# Patient Record
Sex: FEMALE | Race: WHITE | Employment: OTHER | URBAN - METROPOLITAN AREA
[De-identification: names, ages, dates, MRNs, and addresses within clinical notes are randomized per-mention and may not be internally consistent; named-entity substitution may affect disease eponyms.]

---

## 2017-01-23 PROBLEM — J30.0 VMR (VASOMOTOR RHINITIS): Status: ACTIVE | Noted: 2017-01-23

## 2017-02-07 PROBLEM — M18.0 PRIMARY OSTEOARTHRITIS OF BOTH FIRST CARPOMETACARPAL JOINTS: Status: ACTIVE | Noted: 2017-02-07

## 2017-02-07 PROBLEM — M65.312 TRIGGER FINGER OF LEFT THUMB: Status: ACTIVE | Noted: 2017-02-07

## 2017-03-17 ENCOUNTER — OFFICE VISIT (OUTPATIENT)
Dept: UROLOGY | Facility: HOSPITAL | Age: 62
End: 2017-03-17
Attending: OBSTETRICS & GYNECOLOGY
Payer: COMMERCIAL

## 2017-03-17 VITALS
SYSTOLIC BLOOD PRESSURE: 118 MMHG | WEIGHT: 140 LBS | DIASTOLIC BLOOD PRESSURE: 70 MMHG | BODY MASS INDEX: 21.46 KG/M2 | HEIGHT: 67.75 IN

## 2017-03-17 DIAGNOSIS — N81.11 MIDLINE CYSTOCELE: ICD-10-CM

## 2017-03-17 DIAGNOSIS — IMO0002 CYSTOCELE: Primary | ICD-10-CM

## 2017-03-17 DIAGNOSIS — N81.6 RECTOCELE: ICD-10-CM

## 2017-03-17 DIAGNOSIS — N95.2 POSTMENOPAUSAL ATROPHIC VAGINITIS: ICD-10-CM

## 2017-03-17 PROCEDURE — 99201 HC OUTPT EVAL AND MGNT NEW PT LEVEL 1: CPT

## 2017-03-17 RX ORDER — DOXEPIN HYDROCHLORIDE 50 MG/1
1 CAPSULE ORAL DAILY
COMMUNITY
End: 2017-11-24 | Stop reason: ALTCHOICE

## 2017-03-17 RX ORDER — UBIDECARENONE 75 MG
250 CAPSULE ORAL DAILY
COMMUNITY
End: 2017-11-24 | Stop reason: ALTCHOICE

## 2017-03-29 ENCOUNTER — OFFICE VISIT (OUTPATIENT)
Dept: UROLOGY | Facility: HOSPITAL | Age: 62
End: 2017-03-29
Attending: OBSTETRICS & GYNECOLOGY
Payer: COMMERCIAL

## 2017-03-29 VITALS — DIASTOLIC BLOOD PRESSURE: 82 MMHG | SYSTOLIC BLOOD PRESSURE: 124 MMHG

## 2017-03-29 DIAGNOSIS — N81.2 UTEROVAGINAL PROLAPSE, INCOMPLETE: Primary | ICD-10-CM

## 2017-03-29 LAB
CONTROL RUN WITHIN 24 HOURS?: YES
LEUKOCYTE ESTERASE URINE: NEGATIVE
NITRITE URINE: NEGATIVE

## 2017-03-29 PROCEDURE — 51729 CYSTOMETROGRAM W/VP&UP: CPT

## 2017-03-29 PROCEDURE — 51741 ELECTRO-UROFLOWMETRY FIRST: CPT

## 2017-03-29 PROCEDURE — 51784 ANAL/URINARY MUSCLE STUDY: CPT

## 2017-03-29 PROCEDURE — 57160 INSERT PESSARY/OTHER DEVICE: CPT

## 2017-03-29 PROCEDURE — 51797 INTRAABDOMINAL PRESSURE TEST: CPT

## 2017-03-29 NOTE — PROCEDURES
.Patient here for urodynamic testing. Procedure explained and confirmed by patient. See evaluation form for results. Both verbal and written discharge instructions were given.   Patient tolerated procedure well and will follow up with Dr. Austen Long on tablet Rfl: 3   Albuterol Sulfate HFA (PROAIR HFA) 108 (90 BASE) MCG/ACT Inhalation Aero Soln Inhale 2 puffs into the lungs every 6 (six) hours as needed for Wheezing (no more 8 inhalations in 24 hours).  Disp: 1 Inhaler Rfl: 3   CAPSAICIN HP EX Use as dire Leak Point Pressure Leak? Cough Valsalva      100mL 155 69    cm H2O []  Yes [x]  No   REDUCED: pessary  Volume Leak Point Pressure Leak?     Cough Valsalva      150mL 156 60    cm H2O []  Yes [x]  No   250mL 165 55    cm H2O []  Yes [x]  No   350mL 162

## 2017-03-29 NOTE — PATIENT INSTRUCTIONS
[address] MEDICINE    HAVING A URINARY CATHETER AFTER SURGERY    What is a urinary catheter? A catheter is a soft tube used to drain urine from your bladder. Why do I need a catheter?   A urinary catheter is used to help with heali following tips:  · Urinate normally then stand up, walk around for a minute or two, sit back down on the commode and attempt to urinate (double void). · Sit in a tub of warm water and try to urinate in the tub.   · Run warm water over your vaginal area whi are given an appointment to come back to discuss the results and any appropriate treatment recommendations. Please do not hesitate to contact our office with any questions or concerns at [phone].     I acknowledge that I have received verbal and wri

## 2017-04-05 PROBLEM — J45.30 MILD PERSISTENT ASTHMA WITHOUT COMPLICATION: Status: ACTIVE | Noted: 2017-04-05

## 2017-04-05 PROBLEM — J45.30 MILD PERSISTENT ASTHMA WITHOUT COMPLICATION (HCC): Status: ACTIVE | Noted: 2017-04-05

## 2017-04-14 ENCOUNTER — TELEPHONE (OUTPATIENT)
Dept: UROLOGY | Facility: HOSPITAL | Age: 62
End: 2017-04-14

## 2017-04-14 NOTE — TELEPHONE ENCOUNTER
Pt called worried about upcoming surgery. When [facility] called her for preop information, they didn't have listed she was having a hysterectomy.  Discussed w/ pt that Dr Marquita Gomes just hasn't sent her information over yet, but she is still scheduled in our syste

## 2017-04-19 ENCOUNTER — OFFICE VISIT (OUTPATIENT)
Dept: UROLOGY | Facility: HOSPITAL | Age: 62
End: 2017-04-19
Attending: OBSTETRICS & GYNECOLOGY
Payer: COMMERCIAL

## 2017-04-19 VITALS
HEIGHT: 68 IN | BODY MASS INDEX: 21.98 KG/M2 | WEIGHT: 145 LBS | SYSTOLIC BLOOD PRESSURE: 126 MMHG | DIASTOLIC BLOOD PRESSURE: 82 MMHG

## 2017-04-19 DIAGNOSIS — N95.2 POSTMENOPAUSAL ATROPHIC VAGINITIS: ICD-10-CM

## 2017-04-19 DIAGNOSIS — N81.11 MIDLINE CYSTOCELE: ICD-10-CM

## 2017-04-19 DIAGNOSIS — N81.2 UTEROVAGINAL PROLAPSE, INCOMPLETE: Primary | ICD-10-CM

## 2017-04-19 DIAGNOSIS — N81.6 RECTOCELE: ICD-10-CM

## 2017-04-19 PROCEDURE — 99211 OFF/OP EST MAY X REQ PHY/QHP: CPT

## 2017-05-02 PROBLEM — R94.31 ABNORMAL ELECTROCARDIOGRAM (ECG) (EKG): Status: ACTIVE | Noted: 2017-05-02

## 2017-05-22 ENCOUNTER — TELEPHONE (OUTPATIENT)
Dept: OBGYN CLINIC | Facility: CLINIC | Age: 62
End: 2017-05-22

## 2017-05-22 NOTE — TELEPHONE ENCOUNTER
Pt has a surgery on [id number], but has jus returned from overse[place] trip and has a lot of swelling in feet and ankles.  She was given diuretic, and was suggested to take magnesium, but worried that any of those treatments, or just the swelling may interfere wi

## 2017-05-23 NOTE — TELEPHONE ENCOUNTER
Patient informed that per Dr. Lonine Mcgill directions she can proceed with surgery as schedule. Patient verbalizes understanding.

## 2017-05-24 NOTE — H&P
[facility]    PATIENT'S NAME: Brenna Rendon   ATTENDING PHYSICIAN: Austin Braun M.D.    PATIENT ACCOUNT#:   [de-identified]    LOCATION:  OR   [facility]  MEDICAL RECORD #:   [id number]       YOB: 1955  ADMISSION DATE:       05/25/2017    HISTORY

## 2017-05-25 ENCOUNTER — SURGERY (OUTPATIENT)
Age: 62
End: 2017-05-25

## 2017-05-25 ENCOUNTER — HOSPITAL ENCOUNTER (OUTPATIENT)
Facility: HOSPITAL | Age: 62
Setting detail: OBSERVATION
Discharge: HOME | End: 2017-05-27
Attending: OBSTETRICS & GYNECOLOGY | Admitting: OBSTETRICS & GYNECOLOGY
Payer: COMMERCIAL

## 2017-05-25 PROCEDURE — 0UT97ZZ RESECTION OF UTERUS, VIA NATURAL OR ARTIFICIAL OPENING: ICD-10-PCS | Performed by: OBSTETRICS & GYNECOLOGY

## 2017-05-25 PROCEDURE — 0UT77ZZ RESECTION OF BILATERAL FALLOPIAN TUBES, VIA NATURAL OR ARTIFICIAL OPENING: ICD-10-PCS | Performed by: OBSTETRICS & GYNECOLOGY

## 2017-05-25 PROCEDURE — 0JQC0ZZ REPAIR PELVIC REGION SUBCUTANEOUS TISSUE AND FASCIA, OPEN APPROACH: ICD-10-PCS | Performed by: OBSTETRICS & GYNECOLOGY

## 2017-05-25 PROCEDURE — 0UTC7ZZ RESECTION OF CERVIX, VIA NATURAL OR ARTIFICIAL OPENING: ICD-10-PCS | Performed by: OBSTETRICS & GYNECOLOGY

## 2017-05-25 PROCEDURE — 0WQNXZZ REPAIR FEMALE PERINEUM, EXTERNAL APPROACH: ICD-10-PCS | Performed by: OBSTETRICS & GYNECOLOGY

## 2017-05-25 PROCEDURE — 58262 VAG HYST INCLUDING T/O: CPT | Performed by: OBSTETRICS & GYNECOLOGY

## 2017-05-25 PROCEDURE — 0UQF0ZZ REPAIR CUL-DE-SAC, OPEN APPROACH: ICD-10-PCS | Performed by: OBSTETRICS & GYNECOLOGY

## 2017-05-25 PROCEDURE — 0USGXZZ REPOSITION VAGINA, EXTERNAL APPROACH: ICD-10-PCS | Performed by: OBSTETRICS & GYNECOLOGY

## 2017-05-25 RX ORDER — DOCUSATE SODIUM 100 MG/1
100 CAPSULE, LIQUID FILLED ORAL 2 TIMES DAILY
Status: DISCONTINUED | OUTPATIENT
Start: 2017-05-25 | End: 2017-05-27

## 2017-05-25 RX ORDER — GABAPENTIN 300 MG/1
CAPSULE ORAL AS NEEDED
COMMUNITY

## 2017-05-25 RX ORDER — LIDOCAINE HYDROCHLORIDE AND EPINEPHRINE 10; 10 MG/ML; UG/ML
INJECTION, SOLUTION INFILTRATION; PERINEURAL AS NEEDED
Status: DISCONTINUED | OUTPATIENT
Start: 2017-05-25 | End: 2017-05-25 | Stop reason: HOSPADM

## 2017-05-25 RX ORDER — DIPHENHYDRAMINE HYDROCHLORIDE 50 MG/ML
12.5 INJECTION INTRAMUSCULAR; INTRAVENOUS EVERY 4 HOURS PRN
Status: DISCONTINUED | OUTPATIENT
Start: 2017-05-25 | End: 2017-05-27

## 2017-05-25 RX ORDER — ONDANSETRON 2 MG/ML
INJECTION INTRAMUSCULAR; INTRAVENOUS
Status: COMPLETED
Start: 2017-05-25 | End: 2017-05-25

## 2017-05-25 RX ORDER — ONDANSETRON 2 MG/ML
4 INJECTION INTRAMUSCULAR; INTRAVENOUS AS NEEDED
Status: DISCONTINUED | OUTPATIENT
Start: 2017-05-25 | End: 2017-05-25 | Stop reason: HOSPADM

## 2017-05-25 RX ORDER — ACETAMINOPHEN 500 MG
1000 TABLET ORAL 2 TIMES DAILY
Status: DISCONTINUED | OUTPATIENT
Start: 2017-05-25 | End: 2017-05-27

## 2017-05-25 RX ORDER — ONDANSETRON 2 MG/ML
4 INJECTION INTRAMUSCULAR; INTRAVENOUS EVERY 8 HOURS PRN
Status: DISCONTINUED | OUTPATIENT
Start: 2017-05-25 | End: 2017-05-27

## 2017-05-25 RX ORDER — KETOROLAC TROMETHAMINE 30 MG/ML
15 INJECTION, SOLUTION INTRAMUSCULAR; INTRAVENOUS EVERY 6 HOURS PRN
Status: DISCONTINUED | OUTPATIENT
Start: 2017-05-25 | End: 2017-05-26

## 2017-05-25 RX ORDER — HYDROCODONE BITARTRATE AND ACETAMINOPHEN 5; 325 MG/1; MG/1
2 TABLET ORAL EVERY 4 HOURS PRN
Status: DISCONTINUED | OUTPATIENT
Start: 2017-05-25 | End: 2017-05-27

## 2017-05-25 RX ORDER — HYDROMORPHONE HYDROCHLORIDE 1 MG/ML
INJECTION, SOLUTION INTRAMUSCULAR; INTRAVENOUS; SUBCUTANEOUS
Status: COMPLETED
Start: 2017-05-25 | End: 2017-05-25

## 2017-05-25 RX ORDER — ONDANSETRON 2 MG/ML
4 INJECTION INTRAMUSCULAR; INTRAVENOUS EVERY 6 HOURS PRN
Status: DISCONTINUED | OUTPATIENT
Start: 2017-05-25 | End: 2017-05-27

## 2017-05-25 RX ORDER — HYDROCODONE BITARTRATE AND ACETAMINOPHEN 5; 325 MG/1; MG/1
1 TABLET ORAL EVERY 4 HOURS PRN
Status: DISCONTINUED | OUTPATIENT
Start: 2017-05-25 | End: 2017-05-25

## 2017-05-25 RX ORDER — HYDROCODONE BITARTRATE AND ACETAMINOPHEN 5; 325 MG/1; MG/1
2 TABLET ORAL EVERY 4 HOURS PRN
Status: DISCONTINUED | OUTPATIENT
Start: 2017-05-25 | End: 2017-05-25

## 2017-05-25 RX ORDER — DEXAMETHASONE SODIUM PHOSPHATE 4 MG/ML
4 VIAL (ML) INJECTION AS NEEDED
Status: DISCONTINUED | OUTPATIENT
Start: 2017-05-25 | End: 2017-05-25 | Stop reason: HOSPADM

## 2017-05-25 RX ORDER — IBUPROFEN 600 MG/1
600 TABLET ORAL EVERY 6 HOURS PRN
Status: DISCONTINUED | OUTPATIENT
Start: 2017-05-25 | End: 2017-05-27

## 2017-05-25 RX ORDER — MIDAZOLAM HYDROCHLORIDE 1 MG/ML
1 INJECTION INTRAMUSCULAR; INTRAVENOUS EVERY 5 MIN PRN
Status: DISCONTINUED | OUTPATIENT
Start: 2017-05-25 | End: 2017-05-25 | Stop reason: HOSPADM

## 2017-05-25 RX ORDER — SODIUM CHLORIDE, SODIUM LACTATE, POTASSIUM CHLORIDE, CALCIUM CHLORIDE 600; 310; 30; 20 MG/100ML; MG/100ML; MG/100ML; MG/100ML
INJECTION, SOLUTION INTRAVENOUS CONTINUOUS
Status: DISCONTINUED | OUTPATIENT
Start: 2017-05-25 | End: 2017-05-26

## 2017-05-25 RX ORDER — HYDROMORPHONE HYDROCHLORIDE 1 MG/ML
0.4 INJECTION, SOLUTION INTRAMUSCULAR; INTRAVENOUS; SUBCUTANEOUS EVERY 5 MIN PRN
Status: DISCONTINUED | OUTPATIENT
Start: 2017-05-25 | End: 2017-05-25 | Stop reason: HOSPADM

## 2017-05-25 RX ORDER — GABAPENTIN 100 MG/1
200 CAPSULE ORAL 2 TIMES DAILY
Status: DISCONTINUED | OUTPATIENT
Start: 2017-05-25 | End: 2017-05-27

## 2017-05-25 RX ORDER — AZELASTINE 1 MG/ML
1 SPRAY, METERED NASAL EVERY MORNING
COMMUNITY
End: 2018-07-18

## 2017-05-25 RX ORDER — METOCLOPRAMIDE HYDROCHLORIDE 5 MG/ML
10 INJECTION INTRAMUSCULAR; INTRAVENOUS AS NEEDED
Status: DISCONTINUED | OUTPATIENT
Start: 2017-05-25 | End: 2017-05-25 | Stop reason: HOSPADM

## 2017-05-25 RX ORDER — HYDROCODONE BITARTRATE AND ACETAMINOPHEN 5; 325 MG/1; MG/1
1 TABLET ORAL EVERY 4 HOURS PRN
Status: DISCONTINUED | OUTPATIENT
Start: 2017-05-25 | End: 2017-05-27

## 2017-05-25 RX ORDER — NALOXONE HYDROCHLORIDE 0.4 MG/ML
0.08 INJECTION, SOLUTION INTRAMUSCULAR; INTRAVENOUS; SUBCUTANEOUS
Status: DISCONTINUED | OUTPATIENT
Start: 2017-05-25 | End: 2017-05-27

## 2017-05-25 RX ORDER — KETOROLAC TROMETHAMINE 30 MG/ML
30 INJECTION, SOLUTION INTRAMUSCULAR; INTRAVENOUS EVERY 6 HOURS PRN
Status: DISCONTINUED | OUTPATIENT
Start: 2017-05-25 | End: 2017-05-25

## 2017-05-25 RX ORDER — MEPERIDINE HYDROCHLORIDE 25 MG/ML
12.5 INJECTION INTRAMUSCULAR; INTRAVENOUS; SUBCUTANEOUS AS NEEDED
Status: DISCONTINUED | OUTPATIENT
Start: 2017-05-25 | End: 2017-05-25 | Stop reason: HOSPADM

## 2017-05-25 RX ORDER — ZOLPIDEM TARTRATE 5 MG/1
5 TABLET ORAL NIGHTLY PRN
Status: DISCONTINUED | OUTPATIENT
Start: 2017-05-25 | End: 2017-05-27

## 2017-05-25 RX ORDER — DEXTROSE, SODIUM CHLORIDE, SODIUM LACTATE, POTASSIUM CHLORIDE, AND CALCIUM CHLORIDE 5; .6; .31; .03; .02 G/100ML; G/100ML; G/100ML; G/100ML; G/100ML
INJECTION, SOLUTION INTRAVENOUS CONTINUOUS
Status: DISCONTINUED | OUTPATIENT
Start: 2017-05-25 | End: 2017-05-26

## 2017-05-25 RX ORDER — KETOROLAC TROMETHAMINE 30 MG/ML
INJECTION, SOLUTION INTRAMUSCULAR; INTRAVENOUS
Status: COMPLETED
Start: 2017-05-25 | End: 2017-05-25

## 2017-05-25 RX ORDER — DIPHENHYDRAMINE HYDROCHLORIDE 50 MG/ML
12.5 INJECTION INTRAMUSCULAR; INTRAVENOUS AS NEEDED
Status: DISCONTINUED | OUTPATIENT
Start: 2017-05-25 | End: 2017-05-25 | Stop reason: HOSPADM

## 2017-05-25 RX ORDER — KETOROLAC TROMETHAMINE 30 MG/ML
30 INJECTION, SOLUTION INTRAMUSCULAR; INTRAVENOUS EVERY 6 HOURS PRN
Status: DISCONTINUED | OUTPATIENT
Start: 2017-05-25 | End: 2017-05-26

## 2017-05-25 RX ORDER — ONDANSETRON 4 MG/1
4 TABLET, FILM COATED ORAL EVERY 8 HOURS PRN
Status: DISCONTINUED | OUTPATIENT
Start: 2017-05-25 | End: 2017-05-27

## 2017-05-25 RX ORDER — METOCLOPRAMIDE HYDROCHLORIDE 5 MG/ML
10 INJECTION INTRAMUSCULAR; INTRAVENOUS EVERY 8 HOURS PRN
Status: DISCONTINUED | OUTPATIENT
Start: 2017-05-25 | End: 2017-05-27

## 2017-05-25 RX ORDER — HYDROMORPHONE HYDROCHLORIDE 1 MG/ML
0.4 INJECTION, SOLUTION INTRAMUSCULAR; INTRAVENOUS; SUBCUTANEOUS EVERY 30 MIN PRN
Status: DISCONTINUED | OUTPATIENT
Start: 2017-05-25 | End: 2017-05-26

## 2017-05-25 RX ORDER — NALOXONE HYDROCHLORIDE 0.4 MG/ML
80 INJECTION, SOLUTION INTRAMUSCULAR; INTRAVENOUS; SUBCUTANEOUS AS NEEDED
Status: DISCONTINUED | OUTPATIENT
Start: 2017-05-25 | End: 2017-05-25 | Stop reason: HOSPADM

## 2017-05-25 RX ORDER — NALBUPHINE HCL 10 MG/ML
2.5 AMPUL (ML) INJECTION EVERY 4 HOURS PRN
Status: DISCONTINUED | OUTPATIENT
Start: 2017-05-25 | End: 2017-05-27

## 2017-05-25 NOTE — OPERATIVE REPORT
PRE-OP DIAGNOSIS:  Uterovaginal prolapse    POST-OP DIAGNOSIS:  Same    PROCEDURE:  Repair of enterocele; uterosacral ligament suspension; anterior colporrhaphy; posterior colporrhaphy; cystourethroscopy    SURGEON:  Sonal Causey MD    ASSISTANT:  Zahra Stewart in addition, urine was again seen from both ureteral orifices, confirming that the ureters remained patent. Attention was then directed to the posterior compartment. The redundant posterior vaginal epithelium was excised.  0 Vicryl suture was then use

## 2017-05-25 NOTE — PROGRESS NOTES
[facility]  Brief Op Note    Jason Poster Location: OR   CSN [id number] MRN [id number]   Admission Date 5/25/2017 Operation Date 5/25/2017   Attending Physician Allison Murillo MD Operating Physician Farhad Valdovinos MD     Pre-Operative Diagnosis: Marlena Miguel

## 2017-05-25 NOTE — CONSULTS
[facility] Pharmacy Note:  Pain Consult    Chey Bowen is a 62year old female started on Dilaudid PCA by Dr. Arlene Chahal. Pharmacy was consulted to review medication profile and to discontinue previously ordered narcotics and sedatives.     Medication profile was

## 2017-05-25 NOTE — INTERVAL H&P NOTE
Pre-op Diagnosis: UTEROVAGINAL PROLAPSE, CYSTOCELE, RECTOCELE      The above referenced H&P was reviewed by Stephanie Stroud MD on 5/25/2017, the patient was examined and no significant changes have occurred in the patient's condition since the H&P was perf

## 2017-05-25 NOTE — PLAN OF CARE
GASTROINTESTINAL - ADULT    • Maintains or returns to baseline bowel function Not Progressing        Patient/Family Goals    • Patient/Family Long Term Goal Not Progressing          GASTROINTESTINAL - ADULT    • Minimal or absence of nausea and vomiting Pr

## 2017-05-26 PROBLEM — N81.4 UTEROVAGINAL PROLAPSE: Status: ACTIVE | Noted: 2017-05-26

## 2017-05-26 RX ORDER — IBUPROFEN 600 MG/1
600 TABLET ORAL EVERY 6 HOURS
Qty: 40 TABLET | Refills: 2 | Status: SHIPPED
Start: 2017-05-26 | End: 2017-06-21

## 2017-05-26 RX ORDER — HYDROCODONE BITARTRATE AND ACETAMINOPHEN 5; 325 MG/1; MG/1
1-2 TABLET ORAL EVERY 6 HOURS PRN
Qty: 30 TABLET | Refills: 0 | Status: SHIPPED
Start: 2017-05-26 | End: 2017-05-27

## 2017-05-26 RX ORDER — HYDROCODONE BITARTRATE AND ACETAMINOPHEN 5; 325 MG/1; MG/1
1 TABLET ORAL EVERY 4 HOURS PRN
Qty: 30 TABLET | Refills: 0 | Status: SHIPPED | OUTPATIENT
Start: 2017-05-26 | End: 2017-06-21

## 2017-05-26 RX ORDER — ONDANSETRON 4 MG/1
4 TABLET, ORALLY DISINTEGRATING ORAL ONCE
Status: COMPLETED | OUTPATIENT
Start: 2017-05-26 | End: 2017-05-26

## 2017-05-26 NOTE — PLAN OF CARE
Assumed pt care at 0730. Aa/ox4. Breathing unlabored. C/o surgical site pain. Tolerated norco well this morning and pain now controlled. Hesitant to get out of bed earlier, has been in the chair already and ambulated in hallway.  Yoni villeda, patent, will

## 2017-05-26 NOTE — PAYOR COMM NOTE
Attending Physician: Daquan Storm MD    Review Type: ADMISSION   Reviewer: Saqib Jones       Date: May 26, 2017 - 8:29 AM  Payor: [employer]  Authorization Number: [id number]/A  Admit date: 5/25/2017  9:44 AM   Admitted from Emergency Dept.:     PRE-OP DIAGNOSI 5/25/2017 1101 Restarted 2 g Intravenous Rani Mejia MD      dextrose 5 % /lactated ringers infusion     Date Action Dose Route User    5/25/2017 1343 New Bag (none) Intravenous Albertina Ortega, RN      docusate sodium (COLACE) cap 100 mg     Date Actio Abnormality         Status                     ---------                               -----------         ------                     CBC W/ DIFFERENTIAL[[id number]]                                                           Please view results for

## 2017-05-26 NOTE — PROGRESS NOTES
Attempted to ambulate earlier, but had coughing attack; now waiting to use inhalers    Operative findings discussed     Pain controlled, tolerating general diet  Abd soft, NT  urine clear in [place]    POD 1 - VH, repairs - doing well    PLAN -  Ambulate; PO

## 2017-05-27 ENCOUNTER — APPOINTMENT (OUTPATIENT)
Dept: GENERAL RADIOLOGY | Facility: HOSPITAL | Age: 62
End: 2017-05-27
Attending: OBSTETRICS & GYNECOLOGY
Payer: COMMERCIAL

## 2017-05-27 VITALS
HEART RATE: 72 BPM | OXYGEN SATURATION: 94 % | BODY MASS INDEX: 22.13 KG/M2 | TEMPERATURE: 98 F | HEIGHT: 68 IN | WEIGHT: 146 LBS | DIASTOLIC BLOOD PRESSURE: 74 MMHG | RESPIRATION RATE: 16 BRPM | SYSTOLIC BLOOD PRESSURE: 142 MMHG

## 2017-05-27 PROCEDURE — 74000 XR ABDOMEN (KUB) (1 AP VIEW)  (CPT=74000): CPT | Performed by: OBSTETRICS & GYNECOLOGY

## 2017-05-27 RX ORDER — ONDANSETRON 4 MG/1
4 TABLET, ORALLY DISINTEGRATING ORAL EVERY 8 HOURS PRN
Qty: 15 TABLET | Refills: 0 | Status: SHIPPED | OUTPATIENT
Start: 2017-05-27 | End: 2017-06-21

## 2017-05-27 RX ORDER — HYDROCODONE BITARTRATE AND ACETAMINOPHEN 5; 325 MG/1; MG/1
1-2 TABLET ORAL EVERY 6 HOURS PRN
Qty: 30 TABLET | Refills: 0 | Status: SHIPPED | OUTPATIENT
Start: 2017-05-27 | End: 2017-06-21

## 2017-05-27 RX ORDER — ACETAMINOPHEN 500 MG
1000 TABLET ORAL 2 TIMES DAILY
Qty: 30 TABLET | Refills: 0 | Status: SHIPPED | OUTPATIENT
Start: 2017-05-27 | End: 2017-05-27

## 2017-05-27 RX ORDER — ACETAMINOPHEN 500 MG
1000 TABLET ORAL EVERY 6 HOURS PRN
Qty: 30 TABLET | Refills: 0 | Status: SHIPPED | OUTPATIENT
Start: 2017-05-27 | End: 2017-06-21

## 2017-05-27 NOTE — PROGRESS NOTES
Spoke with dr Cristiano Bryan re: pt dry heaving & pt c/o nausea. New orders noted for stat kub & stat labs.   Lab & xray paged to notify of new orders

## 2017-05-27 NOTE — PLAN OF CARE
Assumed care for this patient at 0730: patient alert and oriented. S/p vaginal hysterectomy. Complaining of abdominal pain. norco prn. Patient states she is comfortable. Denies nausea. Walked in [place]. Patient encourage to walk and increase activity.  Plan f

## 2017-05-27 NOTE — PROGRESS NOTES
Patient did not go home yesterday due to nausea and lightheadedness from [address] this am until took norco and symptoms reoccured,. To avoid norco and take ibuprofen for pain.  Plan on discharge this pm

## 2017-05-27 NOTE — PLAN OF CARE
GASTROINTESTINAL - ADULT    • Minimal or absence of nausea and vomiting Adequate for Discharge    • Maintains or returns to baseline bowel function Adequate for Discharge        GENITOURINARY - ADULT    • Absence of urinary retention Adequate for Discharge

## 2017-05-27 NOTE — PLAN OF CARE
Patient still with dizziness on and off. Spoke with MD. Encourage to take motrin and tylenol and avoid norco. Patient able to tolerate a little bit of soup this afternoon.

## 2017-05-27 NOTE — DISCHARGE SUMMARY
[facility]    PATIENT'S NAME: Tretha Duverney   ATTENDING PHYSICIAN: Maximo Vealrde M.D.    PATIENT ACCOUNT#:   [de-identified]    LOCATION:  [address] [place] [address]  MEDICAL RECORD #:   [id number]       YOB: 1955  ADMISSION DATE:       05/25/2017

## 2017-05-27 NOTE — PROGRESS NOTES
Spoke with dr Stanislav Feliz md aware of stat test results.    md states to d/c home with rx for zofran odt

## 2017-05-28 NOTE — PROGRESS NOTES
Pt d/c home. D/c instructed given to pt & pt's  at great length, including shrestha cath care, pelvic rest, diet, hydration, activity, pain control, home meds & f/u care. Verbalized understanding of all instructions. Left unit stable via w/c.

## 2017-05-31 ENCOUNTER — OFFICE VISIT (OUTPATIENT)
Dept: UROLOGY | Facility: HOSPITAL | Age: 62
End: 2017-05-31
Attending: OBSTETRICS & GYNECOLOGY
Payer: COMMERCIAL

## 2017-05-31 VITALS
WEIGHT: 146 LBS | HEART RATE: 80 BPM | DIASTOLIC BLOOD PRESSURE: 78 MMHG | HEIGHT: 67 IN | SYSTOLIC BLOOD PRESSURE: 110 MMHG | BODY MASS INDEX: 22.91 KG/M2 | TEMPERATURE: 98 F

## 2017-05-31 DIAGNOSIS — R33.9 URINARY RETENTION: ICD-10-CM

## 2017-05-31 DIAGNOSIS — Z98.890 POST-OPERATIVE STATE: Primary | ICD-10-CM

## 2017-05-31 PROCEDURE — 99212 OFFICE O/P EST SF 10 MIN: CPT

## 2017-05-31 NOTE — PATIENT INSTRUCTIONS
Voiding Trial Instructions  You have passed your voiding trial at 9:30. Please make sure you are drinking some water today. You can take your Motrin to help with any swelling from the catheter.   It is important to try and empty your bladder every two dinora

## 2017-05-31 NOTE — PROCEDURES
.Patient here for voiding trial.  Has not been taking ibuprofen rt nausea, no emesis. Feels she does not react well to anesthesia. Eating bananas and toast mostly. Walking about home but feeling weak and wanting catheter out.   Says it is causing her dis

## 2017-06-05 ENCOUNTER — TELEPHONE (OUTPATIENT)
Dept: UROLOGY | Facility: HOSPITAL | Age: 62
End: 2017-06-05

## 2017-06-05 NOTE — TELEPHONE ENCOUNTER
Pt called with co left sided lower quad pain that gets much worse when she is coughing, feels pain is getting worse, hurts to lift her leg, denies any swelling, SOB, fever, constipation, voiding without difficulty, taking Motrin,  called her internist to g

## 2017-06-20 ENCOUNTER — TELEPHONE (OUTPATIENT)
Dept: OBGYN CLINIC | Facility: CLINIC | Age: 62
End: 2017-06-20

## 2017-06-20 NOTE — TELEPHONE ENCOUNTER
Pt is post TVH on 05/22/17, has a f/u appt on 06/22/17.  Expressing concern about itching in vag area

## 2017-06-21 PROBLEM — Z12.31 SCREENING MAMMOGRAM, ENCOUNTER FOR: Status: ACTIVE | Noted: 2017-06-21

## 2017-06-21 PROBLEM — Z11.59 NEED FOR HEPATITIS C SCREENING TEST: Status: ACTIVE | Noted: 2017-06-21

## 2017-06-21 PROBLEM — Z79.899 ENCOUNTER FOR LONG-TERM (CURRENT) USE OF MEDICATIONS: Status: ACTIVE | Noted: 2017-06-21

## 2017-06-21 PROBLEM — Z00.00 LABORATORY EXAMINATION ORDERED AS PART OF A ROUTINE GENERAL MEDICAL EXAMINATION: Status: ACTIVE | Noted: 2017-06-21

## 2017-06-21 PROBLEM — N81.4 UTEROVAGINAL PROLAPSE: Status: RESOLVED | Noted: 2017-05-26 | Resolved: 2017-06-21

## 2017-06-21 PROBLEM — Z98.890 STATUS POST COLONOSCOPY: Status: ACTIVE | Noted: 2017-06-21

## 2017-06-21 NOTE — TELEPHONE ENCOUNTER
Patient states that her symptoms are resolved as of today, she does have an appointment tomorrow with Dr. Dede Bhat to follow up.

## 2017-06-22 ENCOUNTER — OFFICE VISIT (OUTPATIENT)
Dept: OBGYN CLINIC | Facility: CLINIC | Age: 62
End: 2017-06-22

## 2017-06-22 VITALS
SYSTOLIC BLOOD PRESSURE: 120 MMHG | DIASTOLIC BLOOD PRESSURE: 70 MMHG | WEIGHT: 145 LBS | RESPIRATION RATE: 16 BRPM | HEIGHT: 67 IN | HEART RATE: 72 BPM | TEMPERATURE: 99 F | BODY MASS INDEX: 22.76 KG/M2

## 2017-06-22 DIAGNOSIS — Z90.710 H/O: HYSTERECTOMY: Primary | ICD-10-CM

## 2017-06-22 NOTE — PROGRESS NOTES
She had a vaginal hysterectomy and repairs with Dr. Bart Torrez. She went home with the catheter. It is removed in Dr. Kurtis Moya office and she has not had any problems urinating. No problems with the bowel movement.   Had vaginal itching 2 days ago use 1 day min

## 2017-06-29 PROBLEM — M75.01 ADHESIVE CAPSULITIS OF RIGHT SHOULDER: Status: ACTIVE | Noted: 2017-06-29

## 2017-06-29 PROBLEM — M25.812 SHOULDER IMPINGEMENT, LEFT: Status: ACTIVE | Noted: 2017-06-29

## 2017-06-29 PROBLEM — M75.42 SHOULDER IMPINGEMENT, LEFT: Status: ACTIVE | Noted: 2017-06-29

## 2017-07-10 ENCOUNTER — OFFICE VISIT (OUTPATIENT)
Dept: UROLOGY | Facility: HOSPITAL | Age: 62
End: 2017-07-10
Attending: OBSTETRICS & GYNECOLOGY
Payer: COMMERCIAL

## 2017-07-10 VITALS
WEIGHT: 145 LBS | BODY MASS INDEX: 22.76 KG/M2 | HEIGHT: 67 IN | SYSTOLIC BLOOD PRESSURE: 128 MMHG | DIASTOLIC BLOOD PRESSURE: 84 MMHG

## 2017-07-10 DIAGNOSIS — Z98.890 POST-OPERATIVE STATE: Primary | ICD-10-CM

## 2017-07-10 PROCEDURE — 99211 OFF/OP EST MAY X REQ PHY/QHP: CPT

## 2017-08-18 ENCOUNTER — TELEPHONE (OUTPATIENT)
Dept: UROLOGY | Facility: HOSPITAL | Age: 62
End: 2017-08-18

## 2017-08-18 NOTE — TELEPHONE ENCOUNTER
Pt called to ask some questions about her recovery, states she continues to have stomach aches to\"left side of belly button\" about 3 times weekly, having daily, soft bm's, stopped bowel regimen a few weeks ago to see if it would help.   Stomach aches occu

## 2017-08-23 ENCOUNTER — TELEPHONE (OUTPATIENT)
Dept: UROLOGY | Facility: HOSPITAL | Age: 62
End: 2017-08-23

## 2017-08-23 NOTE — TELEPHONE ENCOUNTER
Spoke with Dr. Axel Gomez re pt condition update, Dr. Axel Gomez requests office visit. Pt scheduled 8-30-17 for post op visit to discuss pt concerns. Called and notified, pt agrees to plan.   Pt reports she saw a new internist-GP, was placed on new med, Dicyclomine

## 2017-08-30 ENCOUNTER — OFFICE VISIT (OUTPATIENT)
Dept: UROLOGY | Facility: HOSPITAL | Age: 62
End: 2017-08-30
Attending: OBSTETRICS & GYNECOLOGY
Payer: COMMERCIAL

## 2017-08-30 VITALS
SYSTOLIC BLOOD PRESSURE: 102 MMHG | HEIGHT: 67 IN | WEIGHT: 143 LBS | BODY MASS INDEX: 22.44 KG/M2 | DIASTOLIC BLOOD PRESSURE: 70 MMHG

## 2017-08-30 DIAGNOSIS — Z98.890 POST-OPERATIVE STATE: Primary | ICD-10-CM

## 2017-08-30 PROCEDURE — 99211 OFF/OP EST MAY X REQ PHY/QHP: CPT

## 2017-08-30 RX ORDER — ESTRADIOL 0.1 MG/G
CREAM VAGINAL
Qty: 43 G | Refills: 2 | Status: SHIPPED | OUTPATIENT
Start: 2017-08-30 | End: 2018-11-02

## 2017-10-31 PROBLEM — Z98.890 STATUS POST COLONOSCOPY: Status: RESOLVED | Noted: 2017-06-21 | Resolved: 2017-10-31

## 2017-10-31 PROBLEM — Z12.31 SCREENING MAMMOGRAM, ENCOUNTER FOR: Status: RESOLVED | Noted: 2017-06-21 | Resolved: 2017-10-31

## 2017-10-31 PROBLEM — Z00.00 LABORATORY EXAMINATION ORDERED AS PART OF A ROUTINE GENERAL MEDICAL EXAMINATION: Status: RESOLVED | Noted: 2017-06-21 | Resolved: 2017-10-31

## 2017-10-31 PROBLEM — Z11.59 NEED FOR HEPATITIS C SCREENING TEST: Status: RESOLVED | Noted: 2017-06-21 | Resolved: 2017-10-31

## 2017-10-31 PROBLEM — Z79.899 ENCOUNTER FOR LONG-TERM (CURRENT) USE OF MEDICATIONS: Status: RESOLVED | Noted: 2017-06-21 | Resolved: 2017-10-31

## 2017-11-06 ENCOUNTER — TELEPHONE (OUTPATIENT)
Dept: UROLOGY | Facility: HOSPITAL | Age: 62
End: 2017-11-06

## 2017-11-06 NOTE — TELEPHONE ENCOUNTER
Pt called, continues to c/o stomach aches, not sure if it is related to her surgery in May, is using EStrace cream and intercourse is better, denies difficulty voiding, occasional constipation, reports having stomachpain every time she eats, has been getti

## 2017-11-16 ENCOUNTER — HOSPITAL ENCOUNTER (OUTPATIENT)
Dept: MAMMOGRAPHY | Age: 62
Discharge: HOME | End: 2017-11-16
Attending: OBSTETRICS & GYNECOLOGY
Payer: COMMERCIAL

## 2017-11-16 DIAGNOSIS — Z12.31 VISIT FOR SCREENING MAMMOGRAM: ICD-10-CM

## 2017-11-16 PROCEDURE — 77067 SCR MAMMO BI INCL CAD: CPT | Performed by: OBSTETRICS & GYNECOLOGY

## 2017-11-24 ENCOUNTER — HOSPITAL ENCOUNTER (INPATIENT)
Facility: HOSPITAL | Age: 62
LOS: 3 days | Discharge: HOME | DRG: 392 | End: 2017-11-27
Attending: INTERNAL MEDICINE | Admitting: INTERNAL MEDICINE
Payer: COMMERCIAL

## 2017-11-24 PROBLEM — K57.92 DIVERTICULITIS: Status: ACTIVE | Noted: 2017-11-24

## 2017-11-24 RX ORDER — ACETAMINOPHEN 325 MG/1
650 TABLET ORAL EVERY 6 HOURS PRN
Status: DISCONTINUED | OUTPATIENT
Start: 2017-11-24 | End: 2017-11-27

## 2017-11-24 RX ORDER — ONDANSETRON 2 MG/ML
4 INJECTION INTRAMUSCULAR; INTRAVENOUS EVERY 6 HOURS PRN
Status: DISCONTINUED | OUTPATIENT
Start: 2017-11-24 | End: 2017-11-27

## 2017-11-24 RX ORDER — GABAPENTIN 300 MG/1
300 CAPSULE ORAL 2 TIMES DAILY
Status: DISCONTINUED | OUTPATIENT
Start: 2017-11-24 | End: 2017-11-27

## 2017-11-24 RX ORDER — AZELASTINE 1 MG/ML
1 SPRAY, METERED NASAL EVERY MORNING
Status: DISCONTINUED | OUTPATIENT
Start: 2017-11-25 | End: 2017-11-27

## 2017-11-24 RX ORDER — METRONIDAZOLE 500 MG/100ML
500 INJECTION, SOLUTION INTRAVENOUS EVERY 8 HOURS
Status: DISCONTINUED | OUTPATIENT
Start: 2017-11-24 | End: 2017-11-27

## 2017-11-24 RX ORDER — SODIUM CHLORIDE 9 MG/ML
INJECTION, SOLUTION INTRAVENOUS CONTINUOUS
Status: DISCONTINUED | OUTPATIENT
Start: 2017-11-24 | End: 2017-11-26

## 2017-11-24 RX ORDER — POTASSIUM CHLORIDE 20 MEQ/1
40 TABLET, EXTENDED RELEASE ORAL EVERY 4 HOURS
Status: COMPLETED | OUTPATIENT
Start: 2017-11-24 | End: 2017-11-25

## 2017-11-24 RX ORDER — CIPROFLOXACIN 2 MG/ML
400 INJECTION, SOLUTION INTRAVENOUS EVERY 12 HOURS
Status: DISCONTINUED | OUTPATIENT
Start: 2017-11-24 | End: 2017-11-27

## 2017-11-24 RX ORDER — HEPARIN SODIUM 5000 [USP'U]/ML
5000 INJECTION, SOLUTION INTRAVENOUS; SUBCUTANEOUS EVERY 8 HOURS SCHEDULED
Status: DISCONTINUED | OUTPATIENT
Start: 2017-11-24 | End: 2017-11-27

## 2017-11-24 RX ORDER — ECHINACEA PURPUREA EXTRACT 125 MG
2 TABLET ORAL AS NEEDED
Status: DISCONTINUED | OUTPATIENT
Start: 2017-11-24 | End: 2017-11-27

## 2017-11-24 RX ORDER — FLUTICASONE PROPIONATE 50 MCG
2 SPRAY, SUSPENSION (ML) NASAL DAILY
Status: DISCONTINUED | OUTPATIENT
Start: 2017-11-24 | End: 2017-11-27

## 2017-11-24 NOTE — H&P
[facility] Hospitalist H&P       CC: No chief complaint on file.        PCP: Amalia Gallo MD    History of Present Illness:  Ms. Amrita Gaspar is a 66 yo female with PMH of HTN, GERD, neuropathic cough, uterovaginal prolapse s/p repair of enterocele and cystoure removal from arm  No date: OTHER SURGICAL HISTORY      Comment: cystocele repair 10 yrs ago  5/25/2017: VAGINAL HYSTERECTOMY N/A      Comment: Procedure: VAGINAL HYSTERECTOMY;  Surgeon:                Vince Smith MD;  Location: [address] [place] [address] MAIN OR     ALL:    Ty Data:    CBC/Chem  Recent Labs   Lab  11/24/17   1304   WBC  7.69   HGB  14.0   MCV  94.1   PLT  332   INR  1.2       Recent Labs   Lab  11/24/17   1304   NA  140   K  3.5*   CL  104   CO2  28.7   BUN  12   CREATSERUM  0.84   GLU  100*       Recent Labs left colon and there are more numerous diverticula in the sigmoid. There is segmental wall edema of the mid sigmoid. Perienteric adipose is indurated and left pelvic fascial planes are thickened.  There are a fewer clustered lymph nodes in the left side of recommended. A letter explaining the results in lay terms has been sent to the patient. This exam was evaluated with a computer-aided device.   This patient's information has been entered into a reminder system with a target due date for the next mammogr

## 2017-11-25 PROCEDURE — 84132 ASSAY OF SERUM POTASSIUM: CPT | Performed by: INTERNAL MEDICINE

## 2017-11-25 PROCEDURE — 80048 BASIC METABOLIC PNL TOTAL CA: CPT | Performed by: INTERNAL MEDICINE

## 2017-11-25 PROCEDURE — 85025 COMPLETE CBC W/AUTO DIFF WBC: CPT | Performed by: INTERNAL MEDICINE

## 2017-11-25 NOTE — PROGRESS NOTES
11/25/17 Pt resting in bed at this time. A+OX3. RA. NPO except sips with meds. Denies nausea. Passing flatus. SCD'S on. Voiding freely. IVF infusing. Pt states pain is \"mild\" and declined offer for pain meds PRN. All questions answered. Cont to monitor.

## 2017-11-25 NOTE — PROGRESS NOTES
NURSING ADMISSION NOTE      Patient admitted via Wheelchair  Oriented to room. Safety precautions initiated. Bed in low position. Call light in reach. Pt admitted at this time, direct from [facility] Care. Alert and oriented.  Family at the

## 2017-11-25 NOTE — PROGRESS NOTES
11/25/17 Call placed to Dr. Meghan Morillo for new consult per order. All questions answered. Dr. Meghan Morillo states he will review CT scan and see pt tomorrow am. Pt aware and agrees. Cont to monitor.

## 2017-11-25 NOTE — PROGRESS NOTES
[facility] Hospitalist Progress Note     [facility]      SUBJECTIVE:  No chest pain or shortness of breath  + abdominal pain again this morning, had resolved overnight  No fevers or chills    OBJECTIVE:  Temp:  [97.5 °F (36.4 °C)-99.5 °F (37.5 °C)] 97.5 °F administered intravenously. ADVERSE REACTION: None. Automated exposure control and ALARA manual techniques for patient specific dose reduction were followed while maintaining the necessary diagnostic image quality.  FINDINGS: LIVER: The liver is grossly nor subcentimeter lymph nodes in the sigmoid mesocolon. No demonstrable abscess or findings of bowel perforation.       [facility] Digital Scrn Bilat W/cad (rnf=[id number])    Result Date: 11/16/2017  PROCEDURE:  DIGITAL SCREENING MAMMOGRAM WITH COMPUTER-AIDED Ghazala Barrientos Subcutaneous Q8H [address]   acetaminophen (TYLENOL) tab 650 mg 650 mg Oral Q6H PRN   ondansetron HCl (ZOFRAN) injection 4 mg 4 mg Intravenous Q6H PRN   metRONIDAZOLE in NaCl (FLAGYL) 5 mg/ml IVPB premix 500 mg 500 mg Intravenous Q8H   Ciprofloxacin in D5W (CIPR

## 2017-11-26 PROCEDURE — 84132 ASSAY OF SERUM POTASSIUM: CPT | Performed by: INTERNAL MEDICINE

## 2017-11-26 PROCEDURE — 93010 ELECTROCARDIOGRAM REPORT: CPT | Performed by: INTERNAL MEDICINE

## 2017-11-26 PROCEDURE — 85025 COMPLETE CBC W/AUTO DIFF WBC: CPT | Performed by: INTERNAL MEDICINE

## 2017-11-26 PROCEDURE — 87493 C DIFF AMPLIFIED PROBE: CPT | Performed by: INTERNAL MEDICINE

## 2017-11-26 PROCEDURE — 82962 GLUCOSE BLOOD TEST: CPT

## 2017-11-26 PROCEDURE — 84484 ASSAY OF TROPONIN QUANT: CPT | Performed by: HOSPITALIST

## 2017-11-26 PROCEDURE — 80053 COMPREHEN METABOLIC PANEL: CPT | Performed by: INTERNAL MEDICINE

## 2017-11-26 PROCEDURE — 93005 ELECTROCARDIOGRAM TRACING: CPT

## 2017-11-26 RX ORDER — DEXTROSE AND SODIUM CHLORIDE 5; .45 G/100ML; G/100ML
INJECTION, SOLUTION INTRAVENOUS CONTINUOUS
Status: DISCONTINUED | OUTPATIENT
Start: 2017-11-26 | End: 2017-11-26

## 2017-11-26 RX ORDER — DEXTROSE MONOHYDRATE 25 G/50ML
INJECTION, SOLUTION INTRAVENOUS
Status: COMPLETED
Start: 2017-11-26 | End: 2017-11-26

## 2017-11-26 RX ORDER — POTASSIUM CHLORIDE 20 MEQ/1
40 TABLET, EXTENDED RELEASE ORAL EVERY 4 HOURS
Status: COMPLETED | OUTPATIENT
Start: 2017-11-26 | End: 2017-11-26

## 2017-11-26 NOTE — CONSULTS
[facility]    PATIENT'S NAME: Romero Moreno   ATTENDING PHYSICIAN: Mega Spring MD   CONSULTING PHYSICIAN: Robyn Blair M.D.    PATIENT ACCOUNT#:   [de-identified]    LOCATION:  [address] [place] [address]  MEDICAL RECORD #:   [id number]       DATE OF BIRTH:  [birth year]

## 2017-11-26 NOTE — PROGRESS NOTES
[facility] Hospitalist Progress Note     [facility]      SUBJECTIVE:  Patient with sweaty and shaking feeling overnight, hypoglycemic and given d50, started on D5 in fluids  Patient had mid sternal chest pressure overnight  Chest pain has currently resolve TECHNIQUE:  Multiple axial images of the abdomen and pelvis were performed from the lung bases through the pubic symphysis after the injection of nonionic intravenous contrast. Oral contrast was used for the exam.  80 mL of Isovue 370 were administered int L5-S1 level. LUNG BASES: There are no discrete masses or consolidations in the visualized lungs. IMPRESSION: 1.  Mid sigmoid acute diverticulitis with reactive inflammatory changes of the surrounding soft tissues and likely reactive subcentimeter lymph n spray 1 spray Nasal QAM   Fluticasone Propionate (FLONASE) 50 MCG/ACT nasal spray 2 spray 2 spray Each Nare Daily   Saline Nasal Spray (SALINE MIST) 2 spray 2 spray Nasal PRN   Heparin Sodium (Porcine) 5000 UNIT/ML injection 5,000 Units 5,000 Units Subcuta

## 2017-11-26 NOTE — PLAN OF CARE
PAIN - ADULT    • Verbalizes/displays adequate comfort level or patient's stated pain goal Progressing          GASTROINTESTINAL - ADULT    • Minimal or absence of nausea and vomiting Progressing        VSS,afebrile. + Chronic productive cough.   Remains NP

## 2017-11-26 NOTE — PROGRESS NOTES
Pt on call light. Reports \"not feeling good. \" Declining tahira abx at this time. +sweats, denies increased/worsening abd pain,chest pain or shoulder pain. \" I feel sweaty, shaky and now my chest is vibrating. \" Vitals obtained, stable.   Blood sugar check

## 2017-11-26 NOTE — CONSULTS
[facility]  Report of Consultation    Summersora Claude Patient Status:  Inpatient    1955 MRN [id number]   [facility] 3NW-A Attending Brittney Love MD   Hosp Day # 2 PCP Chandler Bautista MD     Reason for Consultation:  Chest (chronic obstructive pulmonary disease) (HCC)    • Cough 7/22/2010   • Esophageal reflux    • GERD 6/3/11:  Esophagitis   • H/O mammogram 11/16,09/15,09/14    benign   • High blood pressure    • History of endometrial biopsy 11/1502/15,02/14   • Rayo Amanda QAM  •  Fluticasone Propionate (FLONASE) 50 MCG/ACT nasal spray 2 spray, 2 spray, Each Nare, Daily  •  Saline Nasal Spray (SALINE MIST) 2 spray, 2 spray, Nasal, PRN  •  Heparin Sodium (Porcine) 5000 UNIT/ML injection 5,000 Units, 5,000 Units, Subcutaneous, K 3.5* 3.9  3.9 3.3*    110 106   CO2 28.7 28.0 17.0*   BUN 12 6* 8   CREATSERUM 0.84 0.78 0.61   CA  --  8.9 8.4       Recent Labs   11/24/17  1304 11/26/17  0333   ALT 24 19   AST 18 23   ALB 3.2* 2.9*         Recent Labs   11/26/17  0333 11/26/1

## 2017-11-27 VITALS
WEIGHT: 140 LBS | OXYGEN SATURATION: 95 % | DIASTOLIC BLOOD PRESSURE: 65 MMHG | HEART RATE: 81 BPM | TEMPERATURE: 99 F | BODY MASS INDEX: 21 KG/M2 | RESPIRATION RATE: 18 BRPM | SYSTOLIC BLOOD PRESSURE: 141 MMHG

## 2017-11-27 PROCEDURE — 80048 BASIC METABOLIC PNL TOTAL CA: CPT | Performed by: INTERNAL MEDICINE

## 2017-11-27 PROCEDURE — 85025 COMPLETE CBC W/AUTO DIFF WBC: CPT | Performed by: INTERNAL MEDICINE

## 2017-11-27 RX ORDER — LEVOFLOXACIN 750 MG/1
750 TABLET ORAL DAILY
Qty: 10 TABLET | Refills: 0 | Status: SHIPPED | OUTPATIENT
Start: 2017-11-27 | End: 2017-12-07

## 2017-11-27 RX ORDER — METRONIDAZOLE 500 MG/1
500 TABLET ORAL 3 TIMES DAILY
Qty: 30 TABLET | Refills: 0 | Status: SHIPPED | OUTPATIENT
Start: 2017-11-27 | End: 2017-12-07

## 2017-11-27 RX ORDER — CIPROFLOXACIN 500 MG/1
500 TABLET, FILM COATED ORAL 2 TIMES DAILY
Qty: 20 TABLET | Refills: 0 | Status: SHIPPED | OUTPATIENT
Start: 2017-11-27 | End: 2017-12-03

## 2017-11-27 NOTE — PAYOR COMM NOTE
--------------  ADMISSION REVIEW     Payor: Diane Louisa #:  [id number]  Authorization Number: [id number]    Admit date: 11/24/17  Admit time: 0832       Admitting Physician: Sarah Rhodes MD  Attending Physician:  MD Jorge García ASSESSMENT / PLAN:     Ms. Clement Wilson is a 66 yo female with PMH of HTN, GERD, neuropathic cough, uterovaginal prolapse s/p repair of enterocele and cystourethoscopy, prior cholecystectomy who presented to urgent care today with abdominal pain.      # Acut 11/27/2017 0145 New Bag 500 mg Intravenous Orlene Lipoma, RN    11/26/2017 1738 New Bag 500 mg Intravenous Nancy Santos, RN      ondansetron HCl [facility]) injection 4 mg     Date Action Dose Route User    11/26/2017 1901 Given 4 mg Intravenous Dirk

## 2017-11-27 NOTE — PROGRESS NOTES
[address] Patient Status:  Inpatient    1955 MRN [id number]   [facility] 3NW-A Attending Varghese Grajeda MD   [address] Day # 3 PCP MD Luis A Lebron [facility] is a 65year old female patient.     Maame History:   Diagnosis Date   • ALLERGIC RHINITIS    • Anesthesia complication     very slow to wake    • Asthma     recent diagnosis-    • COPD (chronic obstructive pulmonary disease) ([facility])    • Cough 7/22/2010   • Esophageal reflux    • GERD 6/3/11:  Radha Breath sounds clear to auscultation. Heart: Normal rate. Regular rhythm. S1 normal and S2 normal.    Abdomen: Abdomen is soft. Bowel sounds are normal.   There is no abdominal tenderness. Extremities: Normal range of motion.     Pulses: Distal pul

## 2017-11-27 NOTE — DIETARY NOTE
Nutrition Short Note    Dietitian consult received for low fiber diet education. Reviewed and provided handouts on low residue guidelines, foods to avoid and items appropriate to include-sample menus given. No barriers noted, motivated and receptive.  Pt

## 2017-11-27 NOTE — PROGRESS NOTES
Pt resting in bed and states she does not feel well. Pt had just finished ambulating in halls and states she just doesn't feel right. Pt denies pain. Denies chest pain or sob. C/o slight nausea. Blood sugar rechecked and was 109. Vss.  Iv antibiotics infusi

## 2017-11-27 NOTE — PLAN OF CARE
PAIN - ADULT    • Verbalizes/displays adequate comfort level or patient's stated pain goal Progressing        Patient/Family Goals    • Patient/Family Short Term Goal Progressing        PT RESTING IN BED, EASY NON LABORED BREATHING ON RA. PT ON CLEAR LIQUI

## 2017-11-27 NOTE — PROGRESS NOTES
[facility]  Progress Note    Caterina Suarez Patient Status:  Inpatient    1955 MRN [id number]   [facility] 3NW-A Attending Mychal Stephens MD   [facility] Day # 3 PCP Diego Montalvo MD     Subjective:    Patient reports improve carpometacarpal joints     Trigger finger of left thumb     Asthma [mild persistent] without complication - ERIS Deshpande     Abnormal electrocardiogram T inversion III & aVF (ECG) (EKG) --> Stress ECHO [5/17] normal     S/P [5/17] hysterectomy [ovaries pres

## 2017-11-28 NOTE — PROGRESS NOTES
NURSING DISCHARGE NOTE    Discharged Home via Wheelchair. Accompanied by Support staff  Belongings Taken by patient/family. PT DISCHARGED TO HOME IN STABLE CONDITION. NO IV ACCESS UPON DISCHARGE. IV CATHETER INTACT UPON REMOVAL.  DISCHARGE INSTRUCTIONS

## 2017-11-29 NOTE — PAYOR COMM NOTE
--------------  DISCHARGE REVIEW    Payor: Joni Marcelle #:  [id number]  Authorization Number: [id number]    Admit date: 11/24/17  Admit time:  0524  Discharge Date: 11/27/2017  6:10 PM     Admitting Physician: Kt Conley MD  Attending Physic

## 2017-12-03 ENCOUNTER — HOSPITAL ENCOUNTER (EMERGENCY)
Facility: HOSPITAL | Age: 62
Discharge: HOME | End: 2017-12-03
Attending: EMERGENCY MEDICINE
Payer: COMMERCIAL

## 2017-12-03 VITALS
BODY MASS INDEX: 21.22 KG/M2 | OXYGEN SATURATION: 99 % | TEMPERATURE: 99 F | RESPIRATION RATE: 16 BRPM | WEIGHT: 140 LBS | HEIGHT: 68 IN | HEART RATE: 92 BPM | DIASTOLIC BLOOD PRESSURE: 72 MMHG | SYSTOLIC BLOOD PRESSURE: 146 MMHG

## 2017-12-03 DIAGNOSIS — R11.0 NAUSEA: Primary | ICD-10-CM

## 2017-12-03 DIAGNOSIS — E86.0 DEHYDRATION: ICD-10-CM

## 2017-12-03 PROCEDURE — 87086 URINE CULTURE/COLONY COUNT: CPT | Performed by: EMERGENCY MEDICINE

## 2017-12-03 PROCEDURE — 85025 COMPLETE CBC W/AUTO DIFF WBC: CPT | Performed by: EMERGENCY MEDICINE

## 2017-12-03 PROCEDURE — 99284 EMERGENCY DEPT VISIT MOD MDM: CPT

## 2017-12-03 PROCEDURE — 96360 HYDRATION IV INFUSION INIT: CPT

## 2017-12-03 PROCEDURE — 83690 ASSAY OF LIPASE: CPT | Performed by: EMERGENCY MEDICINE

## 2017-12-03 PROCEDURE — 96361 HYDRATE IV INFUSION ADD-ON: CPT

## 2017-12-03 PROCEDURE — 80053 COMPREHEN METABOLIC PANEL: CPT | Performed by: EMERGENCY MEDICINE

## 2017-12-03 PROCEDURE — 81001 URINALYSIS AUTO W/SCOPE: CPT | Performed by: EMERGENCY MEDICINE

## 2017-12-03 RX ORDER — AMOXICILLIN AND CLAVULANATE POTASSIUM 875; 125 MG/1; MG/1
1 TABLET, FILM COATED ORAL 2 TIMES DAILY
Qty: 8 TABLET | Refills: 0 | Status: SHIPPED | OUTPATIENT
Start: 2017-12-03 | End: 2017-12-07

## 2017-12-03 RX ORDER — ONDANSETRON 4 MG/1
4 TABLET, ORALLY DISINTEGRATING ORAL EVERY 4 HOURS PRN
Qty: 10 TABLET | Refills: 0 | Status: SHIPPED | OUTPATIENT
Start: 2017-12-03 | End: 2017-12-08

## 2017-12-03 NOTE — ED PROVIDER NOTES
Patient Seen in: [facility] Emergency Department    History   Patient presents with:  Nausea/Vomiting/Diarrhea (gastrointestinal)  Abdomen/Flank Pain (GI/)    Stated Complaint: abd pain, medication reaction    HPI    79-year-old female presents em SURGICAL HISTORY      Comment: cystocele repair 10 yrs ago  5/25/2017: VAGINAL HYSTERECTOMY N/A      Comment: Procedure: VAGINAL HYSTERECTOMY;  Surgeon:                Kt Martinez MD;  Location: [address] [place] [address] OR        Smoking status: Former Smoker normal limits   LIPASE - Normal   CBC WITH DIFFERENTIAL WITH PLATELET    Narrative: The following orders were created for panel order CBC WITH DIFFERENTIAL WITH PLATELET.   Procedure                               Abnormality         Status by mouth every 4 (four) hours as needed for Nausea. Qty: 10 tablet Refills: 0    Amoxicillin-Pot Clavulanate 875-125 MG Oral Tab  Take 1 tablet by mouth 2 (two) times daily.   Qty: 8 tablet Refills: 0

## 2017-12-03 NOTE — ED NOTES
Patient ambulated to bathroom with steady gait. She is attempting to provide urine and stool specimens.

## 2017-12-03 NOTE — ED NOTES
Patient provided stool specimen that was soft, formed stool.  ERMD notified that qualifications for cdiff specimen

## 2017-12-03 NOTE — ED INITIAL ASSESSMENT (HPI)
Complaint of vomiting and diarrhea since leaving the hospital this past Monday. Patient said she was admitted for a few days with a dx of diverticulitis. Was started on 2 abx and said she is not tolerating them well.  Patient said she feels dehydrated and h

## 2018-01-08 ENCOUNTER — APPOINTMENT (OUTPATIENT)
Dept: LAB | Facility: HOSPITAL | Age: 63
End: 2018-01-08
Payer: COMMERCIAL

## 2018-01-08 DIAGNOSIS — K57.92 DIVERTICULITIS: ICD-10-CM

## 2018-01-08 LAB
ERYTHROCYTE [DISTWIDTH] IN BLOOD BY AUTOMATED COUNT: 12.7 % (ref 11.5–16)
HCT VFR BLD AUTO: 39.1 % (ref 34–50)
HGB BLD-MCNC: 12.7 G/DL (ref 12–16)
MCH RBC QN AUTO: 30.2 PG (ref 27–33.2)
MCHC RBC AUTO-ENTMCNC: 32.5 G/DL (ref 31–37)
MCV RBC AUTO: 93.1 FL (ref 81–100)
PLATELET # BLD AUTO: 343 10(3)UL (ref 150–450)
RBC # BLD AUTO: 4.2 X10(6)UL (ref 3.8–5.1)
RED CELL DISTRIBUTION WIDTH-SD: 43.3 FL (ref 35.1–46.3)
WBC # BLD AUTO: 6.3 X10(3) UL (ref 4–13)

## 2018-01-08 PROCEDURE — 36415 COLL VENOUS BLD VENIPUNCTURE: CPT

## 2018-01-08 PROCEDURE — 85027 COMPLETE CBC AUTOMATED: CPT

## 2018-01-08 RX ORDER — HEPARIN SODIUM 5000 [USP'U]/ML
5000 INJECTION, SOLUTION INTRAVENOUS; SUBCUTANEOUS ONCE
Status: CANCELLED | OUTPATIENT
Start: 2018-01-08 | End: 2018-01-08

## 2018-01-08 RX ORDER — GARLIC EXTRACT 500 MG
1 CAPSULE ORAL DAILY
COMMUNITY
End: 2018-07-18

## 2018-01-15 ENCOUNTER — SURGERY (OUTPATIENT)
Age: 63
End: 2018-01-15

## 2018-01-15 ENCOUNTER — ANESTHESIA EVENT (OUTPATIENT)
Dept: SURGERY | Facility: HOSPITAL | Age: 63
DRG: 331 | End: 2018-01-15
Payer: COMMERCIAL

## 2018-01-15 ENCOUNTER — HOSPITAL ENCOUNTER (INPATIENT)
Facility: HOSPITAL | Age: 63
LOS: 2 days | Discharge: HOME | DRG: 331 | End: 2018-01-17
Attending: SURGERY | Admitting: SURGERY
Payer: COMMERCIAL

## 2018-01-15 ENCOUNTER — ANESTHESIA (OUTPATIENT)
Dept: SURGERY | Facility: HOSPITAL | Age: 63
DRG: 331 | End: 2018-01-15
Payer: COMMERCIAL

## 2018-01-15 DIAGNOSIS — K57.92 DIVERTICULITIS: Primary | ICD-10-CM

## 2018-01-15 PROCEDURE — S0028 INJECTION, FAMOTIDINE, 20 MG: HCPCS | Performed by: SURGERY

## 2018-01-15 PROCEDURE — 0DTN0ZZ RESECTION OF SIGMOID COLON, OPEN APPROACH: ICD-10-PCS | Performed by: SURGERY

## 2018-01-15 PROCEDURE — 0DJD8ZZ INSPECTION OF LOWER INTESTINAL TRACT, VIA NATURAL OR ARTIFICIAL OPENING ENDOSCOPIC: ICD-10-PCS | Performed by: SURGERY

## 2018-01-15 PROCEDURE — 88307 TISSUE EXAM BY PATHOLOGIST: CPT | Performed by: SURGERY

## 2018-01-15 RX ORDER — KETOROLAC TROMETHAMINE 15 MG/ML
15 INJECTION, SOLUTION INTRAMUSCULAR; INTRAVENOUS EVERY 6 HOURS PRN
Status: DISCONTINUED | OUTPATIENT
Start: 2018-01-15 | End: 2018-01-17

## 2018-01-15 RX ORDER — GABAPENTIN 300 MG/1
300 CAPSULE ORAL 3 TIMES DAILY PRN
Status: DISCONTINUED | OUTPATIENT
Start: 2018-01-15 | End: 2018-01-17

## 2018-01-15 RX ORDER — FAMOTIDINE 10 MG/ML
20 INJECTION, SOLUTION INTRAVENOUS 2 TIMES DAILY
Status: DISCONTINUED | OUTPATIENT
Start: 2018-01-15 | End: 2018-01-17

## 2018-01-15 RX ORDER — SODIUM CHLORIDE, SODIUM LACTATE, POTASSIUM CHLORIDE, CALCIUM CHLORIDE 600; 310; 30; 20 MG/100ML; MG/100ML; MG/100ML; MG/100ML
INJECTION, SOLUTION INTRAVENOUS CONTINUOUS
Status: DISCONTINUED | OUTPATIENT
Start: 2018-01-15 | End: 2018-01-15 | Stop reason: HOSPADM

## 2018-01-15 RX ORDER — ONDANSETRON 2 MG/ML
4 INJECTION INTRAMUSCULAR; INTRAVENOUS EVERY 6 HOURS PRN
Status: DISCONTINUED | OUTPATIENT
Start: 2018-01-15 | End: 2018-01-17

## 2018-01-15 RX ORDER — METRONIDAZOLE 500 MG/100ML
500 INJECTION, SOLUTION INTRAVENOUS ONCE
Status: DISCONTINUED | OUTPATIENT
Start: 2018-01-15 | End: 2018-01-15 | Stop reason: HOSPADM

## 2018-01-15 RX ORDER — SODIUM CHLORIDE 9 MG/ML
INJECTION, SOLUTION INTRAVENOUS CONTINUOUS
Status: DISCONTINUED | OUTPATIENT
Start: 2018-01-15 | End: 2018-01-15 | Stop reason: HOSPADM

## 2018-01-15 RX ORDER — HYDROMORPHONE HYDROCHLORIDE 1 MG/ML
0.5 INJECTION, SOLUTION INTRAMUSCULAR; INTRAVENOUS; SUBCUTANEOUS EVERY 30 MIN PRN
Status: DISCONTINUED | OUTPATIENT
Start: 2018-01-15 | End: 2018-01-17

## 2018-01-15 RX ORDER — BUPIVACAINE HYDROCHLORIDE AND EPINEPHRINE 5; 5 MG/ML; UG/ML
INJECTION, SOLUTION EPIDURAL; INTRACAUDAL; PERINEURAL AS NEEDED
Status: DISCONTINUED | OUTPATIENT
Start: 2018-01-15 | End: 2018-01-15 | Stop reason: HOSPADM

## 2018-01-15 RX ORDER — KETOROLAC TROMETHAMINE 30 MG/ML
30 INJECTION, SOLUTION INTRAMUSCULAR; INTRAVENOUS EVERY 6 HOURS PRN
Status: DISCONTINUED | OUTPATIENT
Start: 2018-01-15 | End: 2018-01-17

## 2018-01-15 RX ORDER — SODIUM PHOSPHATE, DIBASIC AND SODIUM PHOSPHATE, MONOBASIC 7; 19 G/133ML; G/133ML
1 ENEMA RECTAL ONCE AS NEEDED
Status: DISCONTINUED | OUTPATIENT
Start: 2018-01-15 | End: 2018-01-15 | Stop reason: HOSPADM

## 2018-01-15 RX ORDER — METRONIDAZOLE 500 MG/100ML
INJECTION, SOLUTION INTRAVENOUS
Status: DISCONTINUED | OUTPATIENT
Start: 2018-01-15 | End: 2018-01-15

## 2018-01-15 RX ORDER — GABAPENTIN 300 MG/1
300 CAPSULE ORAL 3 TIMES DAILY
Status: DISCONTINUED | OUTPATIENT
Start: 2018-01-15 | End: 2018-01-15

## 2018-01-15 RX ORDER — SODIUM CHLORIDE 9 MG/ML
INJECTION, SOLUTION INTRAVENOUS CONTINUOUS
Status: DISCONTINUED | OUTPATIENT
Start: 2018-01-15 | End: 2018-01-17

## 2018-01-15 RX ORDER — IBUPROFEN 400 MG/1
400 TABLET ORAL EVERY 6 HOURS PRN
Status: DISCONTINUED | OUTPATIENT
Start: 2018-01-15 | End: 2018-01-17

## 2018-01-15 RX ORDER — IBUPROFEN 600 MG/1
600 TABLET ORAL EVERY 6 HOURS PRN
Status: DISCONTINUED | OUTPATIENT
Start: 2018-01-15 | End: 2018-01-17

## 2018-01-15 RX ORDER — HEPARIN SODIUM 5000 [USP'U]/ML
5000 INJECTION, SOLUTION INTRAVENOUS; SUBCUTANEOUS EVERY 12 HOURS SCHEDULED
Status: DISCONTINUED | OUTPATIENT
Start: 2018-01-15 | End: 2018-01-17

## 2018-01-15 RX ORDER — HYDROMORPHONE HYDROCHLORIDE 1 MG/ML
0.4 INJECTION, SOLUTION INTRAMUSCULAR; INTRAVENOUS; SUBCUTANEOUS EVERY 5 MIN PRN
Status: DISCONTINUED | OUTPATIENT
Start: 2018-01-15 | End: 2018-01-15 | Stop reason: HOSPADM

## 2018-01-15 RX ORDER — BACITRACIN 50000 [USP'U]/1
INJECTION, POWDER, LYOPHILIZED, FOR SOLUTION INTRAMUSCULAR AS NEEDED
Status: DISCONTINUED | OUTPATIENT
Start: 2018-01-15 | End: 2018-01-15 | Stop reason: HOSPADM

## 2018-01-15 RX ORDER — ACETAMINOPHEN 325 MG/1
650 TABLET ORAL EVERY 6 HOURS PRN
Status: DISCONTINUED | OUTPATIENT
Start: 2018-01-15 | End: 2018-01-17

## 2018-01-15 RX ORDER — NALOXONE HYDROCHLORIDE 0.4 MG/ML
80 INJECTION, SOLUTION INTRAMUSCULAR; INTRAVENOUS; SUBCUTANEOUS AS NEEDED
Status: DISCONTINUED | OUTPATIENT
Start: 2018-01-15 | End: 2018-01-15 | Stop reason: HOSPADM

## 2018-01-15 RX ORDER — HYDROMORPHONE HYDROCHLORIDE 1 MG/ML
INJECTION, SOLUTION INTRAMUSCULAR; INTRAVENOUS; SUBCUTANEOUS
Status: COMPLETED
Start: 2018-01-15 | End: 2018-01-15

## 2018-01-15 RX ORDER — OXYCODONE HYDROCHLORIDE 5 MG/1
10 TABLET ORAL EVERY 4 HOURS PRN
Status: DISCONTINUED | OUTPATIENT
Start: 2018-01-15 | End: 2018-01-17

## 2018-01-15 RX ORDER — ZOLPIDEM TARTRATE 5 MG/1
5 TABLET ORAL NIGHTLY PRN
Status: DISCONTINUED | OUTPATIENT
Start: 2018-01-15 | End: 2018-01-17

## 2018-01-15 RX ORDER — FAMOTIDINE 20 MG/1
20 TABLET ORAL 2 TIMES DAILY
Status: DISCONTINUED | OUTPATIENT
Start: 2018-01-15 | End: 2018-01-17

## 2018-01-15 RX ORDER — OXYCODONE HYDROCHLORIDE 5 MG/1
2.5 TABLET ORAL EVERY 4 HOURS PRN
Status: DISCONTINUED | OUTPATIENT
Start: 2018-01-15 | End: 2018-01-17

## 2018-01-15 RX ORDER — OXYCODONE HYDROCHLORIDE 5 MG/1
5 TABLET ORAL EVERY 4 HOURS PRN
Status: DISCONTINUED | OUTPATIENT
Start: 2018-01-15 | End: 2018-01-17

## 2018-01-15 RX ORDER — DOCUSATE SODIUM 100 MG/1
100 CAPSULE, LIQUID FILLED ORAL 2 TIMES DAILY
Status: DISCONTINUED | OUTPATIENT
Start: 2018-01-15 | End: 2018-01-17

## 2018-01-15 NOTE — H&P (VIEW-ONLY)
1/3/2018    Patient presents with:  New Patient: Left Abdomen Pain      HPI:    Monika Watts is a 65year old female who presents for evaluation of Left lower quadrant abdominal pain.  Patient describes a 7 month history of left lower quadrant abdominal eulogio 2028  No date: DILATION/CURETTAGE,DIAGNOSTIC  No date: HYSTERECTOMY  1/7/2016: LAPAROSCOPIC CHOLECYSTECTOMY N/A      Comment: Procedure: LAPAROSCOPIC CHOLECYSTECTOMY;                 Surgeon: Mariaelena Nelson MD;  Location: [facility] MAIN OR  No date: OTHER      Comm Comment:Sensitivity to Pain meds  Vicodin [Hydrocodon*        Comment:'PASSED OUT\"    History reviewed. No pertinent family history.     Smoking status: Former Smoker                                                              Packs/day: 1.0

## 2018-01-15 NOTE — ANESTHESIA PREPROCEDURE EVALUATION
PRE-OP EVALUATION    Patient Name: Rekha Mijares    Pre-op Diagnosis: Diverticulitis [K57.92]    Procedure(s):  LAPAROSCOPIC LOW ANTERIOR COLON RESECTION POSSIBLE OPEN    Surgeon(s) and Role:     Syeda Smyth MD - Primary    Pre-op vitals reviewed.   Temp: thyroid  Osteoarthrosis, unspecified whether generalized or localized, unspecified site Esophageal reflux  Pap smear for cervical cancer screening H/O mammogram  History of endometrial biopsy Back problem  Anesthesia complication Diverticulitis          Pa  12/03/2017    11/24/2017   CO2 26.0 12/03/2017   CO2 28.7 11/24/2017   BUN 13 12/03/2017   BUN 12 11/24/2017   CREATSERUM 0.83 12/03/2017   CREATSERUM 0.84 11/24/2017    (H) 12/03/2017    (H) 11/24/2017   CA 9.4 12/03/2017

## 2018-01-15 NOTE — INTERVAL H&P NOTE
Pre-op Diagnosis: Diverticulitis [K57.92]    The above referenced H&P was reviewed by Karen Meanrd MD on 1/15/2018, the patient was examined and no significant changes have occurred in the patient's condition since the H&P was performed.   I discussed with yonny

## 2018-01-15 NOTE — ANESTHESIA POSTPROCEDURE EVALUATION
[address] Patient Status:  Surgery Admit   Age/Gender 65year old female MRN [id number]   [facility] SURGERY Attending Poornima Gracia MD   Hosp Day # 0 PCP China Acosta MD       Anesthesia Post-op Note    Procedur

## 2018-01-15 NOTE — CONSULTS
General Medicine Consult      Reason for consult: post-op medical management     Consulted by: Dr. Ricky Padilla    PCP: Nery Portillo MD      History of Present Illness:  Patient is a 65year old female with PMH sig for GERD, recent diverticulitis who pr Surgeon: Yenni Conroy MD;  Location: [address] [place] [address]  No date: DILATION/CURETTAGE,DIAGNOSTIC  No date: HYSTERECTOMY  1/7/2016: LAPAROSCOPIC CHOLECYSTECTOMY N/A      Comment: Procedure: LAPAROSCOPIC CHOLECYSTECTOMY; Nausea and vomiting  Tylenol W/Codeine       Dizziness    Comment:fainting  Other                       Comment:Blue Cheese - Lip swelling  Pain Relief                 Comment:Sensitivity to Pain meds  Vicodin [Hydrocodon*        Comment:'PASSED OUT\" managing  - On clears, diet advancement per general surgery  - Check Hgb in AM    # Post-op pain   - Transition to PO pain medications as tolerated --> has IV ketorolac and PO oxycodone ordered prn  - Bowel regimen    # Chronic Diverticulitis  - Now s/p co

## 2018-01-15 NOTE — DISCHARGE SUMMARY
[facility]  Discharge Summary    Arcadio Watts Patient Status:  Inpatient    1955 MRN [id number]   [facility] 3NW-A Attending No att. providers found   Hosp Day # 3 PCP Reyes Lamprey, MD     Date of Admission: 2017 that the pain is on her left side, it does not radiate. It waxes and wanes in intensity. Last night she felt like something was going to rupture. She noted that the pain gets worse with PO intake. She did have chills, but no fevers.   She presented to i

## 2018-01-15 NOTE — OPERATIVE REPORT
Report of [facility] Patient Status:  Surgery Admit    1955 MRN [id number]   [facility] SURGERY Attending Jocelyn Mandel MD   Hosp Day # 0 PCP Filiberto Gardner MD           1/15/2018    Suzanna Sosa across the rectum. The mesentery of the colon was then taken down with the ligature device. A small incision was made in the left lower quadrant. Dissection proceeded through the subcutaneous tissue and muscle layers, entering the peritoneal cavity.  The co

## 2018-01-16 LAB
ERYTHROCYTE [DISTWIDTH] IN BLOOD BY AUTOMATED COUNT: 12.8 % (ref 11.5–16)
HCT VFR BLD AUTO: 33.5 % (ref 34–50)
HGB BLD-MCNC: 10.7 G/DL (ref 12–16)
MCH RBC QN AUTO: 30 PG (ref 27–33.2)
MCHC RBC AUTO-ENTMCNC: 31.9 G/DL (ref 31–37)
MCV RBC AUTO: 93.8 FL (ref 81–100)
PLATELET # BLD AUTO: 240 10(3)UL (ref 150–450)
RBC # BLD AUTO: 3.57 X10(6)UL (ref 3.8–5.1)
RED CELL DISTRIBUTION WIDTH-SD: 44.1 FL (ref 35.1–46.3)
WBC # BLD AUTO: 9.1 X10(3) UL (ref 4–13)

## 2018-01-16 PROCEDURE — 85027 COMPLETE CBC AUTOMATED: CPT | Performed by: SURGERY

## 2018-01-16 NOTE — PLAN OF CARE
Patient/Family Goals    • Patient/Family Long Term Goal Not Progressing        PT RESTING IN BED, EASY NON LABORED BREATHING ON RA. VS WNL. BOSWELL DRAINING CONCENTRATED HARITHA URINE. LAP SITES X2 AND SMALL INCISION WITH DRESSING IN PLACE.  PT TOLERATING CLEAR

## 2018-01-16 NOTE — PLAN OF CARE
Patient/Family Goals    • Patient/Family Long Term Goal Not Progressing          GASTROINTESTINAL - ADULT    • Minimal or absence of nausea and vomiting Progressing    • Maintains or returns to baseline bowel function Progressing        PAIN - ADULT    • V

## 2018-01-16 NOTE — PROGRESS NOTES
[facility] Hospitalist Progress Note     PCP: Reyes Lamprey, MD    Chief Complaint: follow-up    Overnight/Interim Events:      SUBJECTIVE:  Pt sitting in chair. No cp/sob. Walking, using IS. +flatus post catheter removal, urinating.  Pain in shoulder ar (TORADOL) injection, OxyCODONE HCl **OR** oxyCODONE HCl **OR** oxyCODONE HCl, Zolpidem Tartrate, ondansetron HCl, gabapentin       Assessment/Plan:      # s/p laparoscopic colon resection  - General surgery managing  - On clears, diet advancement per gener

## 2018-01-16 NOTE — PAYOR COMM NOTE
--------------  ADMISSION REVIEW     Payor: Angelica Tom #:  [id number]  Authorization Number: [id number]    Admit date: 1/15/18  Admit time: 46       Admitting Physician: Phuc Riddle MD  Attending Physician:  Phuc Riddle MD  Primary Care Ph • GERD 6/3/11:  Esophagitis   • H/O mammogram 11/16,09/15,09/14     benign   • History of endometrial biopsy 11/1502/15,02/14   • Osteoarthrosis, unspecified whether generalized or localized, unspecified site     • Pap smear for cervical cancer screening mouth as needed. 3-4 times daily as directed.    Disp:  Rfl:          Scheduled Medication:  • docusate sodium  100 mg Oral BID   • famoTIDine  20 mg Oral BID     Or   • famoTIDine  20 mg Intravenous BID   • Heparin Sodium (Porcine)  5,000 Units Subcutaneou input(s): WBC, HGB, MCV, PLT, BAND, INR in the last 72 hours.     Invalid input(s): LYM#, MONO#, BASOS#, EOSIN#     No results for input(s): NA, K, CL, CO2, BUN, CREATSERUM, GLU, CA, CAION, MG, PHOS in the last 72 hours.     No results for input(s): ALT, A 1/15/2018 2146 Given 5000 Units Subcutaneous (Left Lower Abdomen) Luma Finley RN    1/15/2018 1205 Given 5000 Units Subcutaneous (Right Lower Abdomen) Brandi Lackey RN      ondansetron HCl [facility]) injection 4 mg     Date Action Dose Route User    1/

## 2018-01-16 NOTE — PROGRESS NOTES
[facility]  Progress Note    Shelda Harm Patient Status:  Inpatient    1955 MRN [id number]   [facility] 3NW-A Attending Juancarlos Varma MD   Hosp Day # 1 PCP Arun Guerra MD     Subjective:    Patient c/o incisional pain w shoulder     Diverticulitis      Impression:     66 y/o S/P: Laparoscopic Low Anterior Colon Resection, NAVI  -tolerating clears    Plan:    Clears and full liquids today, likely soft diet tomorrow  Encourage ambulation/IS  Can hep lock IV fluids when lisbet

## 2018-01-17 VITALS
DIASTOLIC BLOOD PRESSURE: 81 MMHG | RESPIRATION RATE: 20 BRPM | TEMPERATURE: 98 F | OXYGEN SATURATION: 97 % | HEART RATE: 75 BPM | WEIGHT: 136.56 LBS | HEIGHT: 68 IN | BODY MASS INDEX: 20.7 KG/M2 | SYSTOLIC BLOOD PRESSURE: 145 MMHG

## 2018-01-17 LAB
BASOPHILS # BLD AUTO: 0.07 X10(3) UL (ref 0–0.1)
BASOPHILS NFR BLD AUTO: 1 %
BUN BLD-MCNC: 6 MG/DL (ref 8–20)
CALCIUM BLD-MCNC: 8.4 MG/DL (ref 8.3–10.3)
CHLORIDE: 108 MMOL/L (ref 101–111)
CO2: 27 MMOL/L (ref 22–32)
CREAT BLD-MCNC: 0.43 MG/DL (ref 0.55–1.02)
EOSINOPHIL # BLD AUTO: 0.15 X10(3) UL (ref 0–0.3)
EOSINOPHIL NFR BLD AUTO: 2.2 %
ERYTHROCYTE [DISTWIDTH] IN BLOOD BY AUTOMATED COUNT: 12.4 % (ref 11.5–16)
GLUCOSE BLD-MCNC: 97 MG/DL (ref 70–99)
HAV IGM SER QL: 1.9 MG/DL (ref 1.7–3)
HCT VFR BLD AUTO: 33.5 % (ref 34–50)
HGB BLD-MCNC: 10.8 G/DL (ref 12–16)
IMMATURE GRANULOCYTE COUNT: 0.02 X10(3) UL (ref 0–1)
IMMATURE GRANULOCYTE RATIO %: 0.3 %
LYMPHOCYTES # BLD AUTO: 1.85 X10(3) UL (ref 0.9–4)
LYMPHOCYTES NFR BLD AUTO: 26.7 %
MCH RBC QN AUTO: 30.2 PG (ref 27–33.2)
MCHC RBC AUTO-ENTMCNC: 32.2 G/DL (ref 31–37)
MCV RBC AUTO: 93.6 FL (ref 81–100)
MONOCYTES # BLD AUTO: 0.78 X10(3) UL (ref 0.1–0.6)
MONOCYTES NFR BLD AUTO: 11.3 %
NEUTROPHIL ABS PRELIM: 4.05 X10 (3) UL (ref 1.3–6.7)
NEUTROPHILS # BLD AUTO: 4.05 X10(3) UL (ref 1.3–6.7)
NEUTROPHILS NFR BLD AUTO: 58.5 %
PLATELET # BLD AUTO: 221 10(3)UL (ref 150–450)
POTASSIUM SERPL-SCNC: 3.2 MMOL/L (ref 3.6–5.1)
RBC # BLD AUTO: 3.58 X10(6)UL (ref 3.8–5.1)
RED CELL DISTRIBUTION WIDTH-SD: 43.1 FL (ref 35.1–46.3)
SODIUM SERPL-SCNC: 144 MMOL/L (ref 136–144)
WBC # BLD AUTO: 6.9 X10(3) UL (ref 4–13)

## 2018-01-17 PROCEDURE — 83735 ASSAY OF MAGNESIUM: CPT | Performed by: INTERNAL MEDICINE

## 2018-01-17 PROCEDURE — 85025 COMPLETE CBC W/AUTO DIFF WBC: CPT | Performed by: PHYSICIAN ASSISTANT

## 2018-01-17 PROCEDURE — 80048 BASIC METABOLIC PNL TOTAL CA: CPT | Performed by: INTERNAL MEDICINE

## 2018-01-17 RX ORDER — HYDROCODONE BITARTRATE AND ACETAMINOPHEN 5; 325 MG/1; MG/1
1 TABLET ORAL EVERY 4 HOURS PRN
Qty: 20 TABLET | Refills: 0 | Status: SHIPPED | OUTPATIENT
Start: 2018-01-17 | End: 2018-01-24

## 2018-01-17 RX ORDER — POTASSIUM CHLORIDE 20 MEQ/1
40 TABLET, EXTENDED RELEASE ORAL EVERY 4 HOURS
Status: DISCONTINUED | OUTPATIENT
Start: 2018-01-17 | End: 2018-01-17

## 2018-01-17 NOTE — PROGRESS NOTES
[facility] Hospitalist Progress Note     PCP: Diego Montalvo MD    Chief Complaint: follow-up    Overnight/Interim Events:      SUBJECTIVE:  Pt walked to bathroom. Some abd tenderness, moreso c coughing. Shoulder pain is gone.  No flatus or belching, fee 2 times per day     • sodium chloride Stopped (01/17/18 0001)     acetaminophen, ibuprofen **OR** ibuprofen, HYDROmorphone HCl PF, ketorolac (TORADOL) injection **OR** ketorolac (TORADOL) injection, OxyCODONE HCl **OR** oxyCODONE HCl **OR** oxyCODONE HCl,

## 2018-01-17 NOTE — PAYOR COMM NOTE
--------------  CONTINUED STAY REVIEW        1/16    POD 1            :  Pt sitting in chair. No cp/sob. Walking, using IS. +flatus post catheter removal, urinating.  Pain in shoulder area.        Temp:  [98.3 °F (36.8 °C)-98.8 °F (37.1 °C)] 98.8 °F (37.1 °

## 2018-01-17 NOTE — PROGRESS NOTES
[facility]  Progress Note    Damaris Torres Patient Status:  Inpatient    1955 MRN [id number]   [facility] 3NW-A Attending Loree Blanco MD   Hosp Day # 2 PCP Freya Royal MD     Subjective:    Patient tolerating full liqui Abnormal electrocardiogram T inversion III & aVF (ECG) (EKG) --> Stress ECHO [5/17] normal     S/P [5/17] hysterectomy [ovaries present] for uterine prolapse - JESSICA Jaramillo     Shoulder impingement, left     Adhesive capsulitis of right shoulder     Diverticu

## 2018-01-17 NOTE — DISCHARGE SUMMARY
[facility]  Discharge Summary    Jeff Mora Patient Status:  Inpatient    1955 MRN [id number]   [facility] 3NW-A Attending Mariaelena Nelson MD   Hosp Day # 2 PCP Lynn Wallace MD     Date of Admission: 1/15/2018    Date of discharge.               Disposition: Home or Self Care    Discharge Condition: Good    Discharge Medications: Current Discharge Medication List    START taking these medications    HYDROcodone-acetaminophen 5-325 MG Oral Tab  Take 1 tablet by mouth every 4

## 2018-02-01 PROBLEM — J45.30 MILD PERSISTENT ASTHMA WITHOUT COMPLICATION (HCC): Status: RESOLVED | Noted: 2017-04-05 | Resolved: 2018-02-01

## 2018-02-01 PROBLEM — J45.30 MILD PERSISTENT ASTHMA WITHOUT COMPLICATION: Status: RESOLVED | Noted: 2017-04-05 | Resolved: 2018-02-01

## 2018-03-12 PROBLEM — M75.02 ADHESIVE CAPSULITIS OF LEFT SHOULDER: Status: ACTIVE | Noted: 2018-03-12

## 2018-03-21 PROBLEM — G89.29 CHRONIC LEFT SHOULDER PAIN: Status: ACTIVE | Noted: 2018-03-21

## 2018-03-21 PROBLEM — R29.3 POOR POSTURE: Status: ACTIVE | Noted: 2018-03-21

## 2018-03-21 PROBLEM — M25.512 CHRONIC LEFT SHOULDER PAIN: Status: ACTIVE | Noted: 2018-03-21

## 2018-04-26 ENCOUNTER — HOSPITAL ENCOUNTER (OUTPATIENT)
Age: 63
Discharge: HOME | End: 2018-04-26
Attending: FAMILY MEDICINE
Payer: COMMERCIAL

## 2018-04-26 VITALS
OXYGEN SATURATION: 99 % | TEMPERATURE: 98 F | HEART RATE: 89 BPM | DIASTOLIC BLOOD PRESSURE: 72 MMHG | RESPIRATION RATE: 18 BRPM | SYSTOLIC BLOOD PRESSURE: 158 MMHG

## 2018-04-26 DIAGNOSIS — T14.8XXA ANIMAL SCRATCH: Primary | ICD-10-CM

## 2018-04-26 DIAGNOSIS — T14.8XXA SUPERFICIAL LACERATION: ICD-10-CM

## 2018-04-26 PROCEDURE — 99203 OFFICE O/P NEW LOW 30 MIN: CPT

## 2018-04-26 PROCEDURE — 99213 OFFICE O/P EST LOW 20 MIN: CPT

## 2018-04-26 RX ORDER — AMOXICILLIN AND CLAVULANATE POTASSIUM 875; 125 MG/1; MG/1
1 TABLET, FILM COATED ORAL 2 TIMES DAILY
Qty: 14 TABLET | Refills: 0 | Status: SHIPPED | OUTPATIENT
Start: 2018-04-26 | End: 2018-05-03

## 2018-04-26 NOTE — ED PROVIDER NOTES
Patient Seen in: [facility] In [address] [place] [address],[address]    History   Patient presents with:  Laceration Abrasion (integumentary)    Stated Complaint: left arm lac    HPI  41-year-old female with a superficial laceration over the left forearm from a dog scr Comment: Procedure: VAGINAL HYSTERECTOMY;  Surgeon:                Meliza Marte MD;  Location: [facility] MAIN OR    Family history reviewed and is not pertinent to presenting problem.     Smoking status: Former Smoker Keep wound clean, dry, and covered with sterile dressing  Observe for signs and symptoms of infection- increased redness, pain, swelling, or drainage or fever, return immediately to [facility], ER or your primary doctor for revaluation. Take Augmentin 875 mg p.

## 2018-10-04 PROBLEM — M75.82 ROTATOR CUFF TENDINITIS, LEFT: Status: ACTIVE | Noted: 2018-10-04

## 2018-11-02 ENCOUNTER — TELEPHONE (OUTPATIENT)
Dept: UROLOGY | Facility: HOSPITAL | Age: 63
End: 2018-11-02

## 2018-11-02 RX ORDER — ESTRADIOL 0.1 MG/G
CREAM VAGINAL
Qty: 1 TUBE | Refills: 3 | Status: SHIPPED | OUTPATIENT
Start: 2018-11-02 | End: 2019-10-23

## 2018-11-20 ENCOUNTER — HOSPITAL ENCOUNTER (OUTPATIENT)
Dept: BONE DENSITY | Age: 63
Discharge: HOME | End: 2018-11-20
Attending: INTERNAL MEDICINE
Payer: COMMERCIAL

## 2018-11-20 ENCOUNTER — HOSPITAL ENCOUNTER (OUTPATIENT)
Dept: MAMMOGRAPHY | Age: 63
Discharge: HOME | End: 2018-11-20
Attending: INTERNAL MEDICINE
Payer: COMMERCIAL

## 2018-11-20 DIAGNOSIS — Z12.31 SCREENING MAMMOGRAM, ENCOUNTER FOR: ICD-10-CM

## 2018-11-20 DIAGNOSIS — Z78.0 MENOPAUSE: ICD-10-CM

## 2018-11-20 PROCEDURE — 77067 SCR MAMMO BI INCL CAD: CPT | Performed by: INTERNAL MEDICINE

## 2018-11-20 PROCEDURE — 77063 BREAST TOMOSYNTHESIS BI: CPT | Performed by: INTERNAL MEDICINE

## 2018-11-20 PROCEDURE — 77080 DXA BONE DENSITY AXIAL: CPT | Performed by: INTERNAL MEDICINE

## 2018-11-21 NOTE — PROGRESS NOTES
Released to [employer]  Pt has already viewed results with provider comments in [facility]

## 2019-02-06 ENCOUNTER — HOSPITAL ENCOUNTER (OUTPATIENT)
Dept: CV DIAGNOSTICS | Facility: HOSPITAL | Age: 64
Discharge: HOME | End: 2019-02-06
Attending: ORTHOPAEDIC SURGERY
Payer: COMMERCIAL

## 2019-02-06 ENCOUNTER — EKG ENCOUNTER (OUTPATIENT)
Dept: LAB | Facility: HOSPITAL | Age: 64
End: 2019-02-06
Attending: ORTHOPAEDIC SURGERY
Payer: COMMERCIAL

## 2019-02-06 LAB
ATRIAL RATE: 77 BPM
P AXIS: 52 DEGREES
P-R INTERVAL: 138 MS
Q-T INTERVAL: 380 MS
QRS DURATION: 90 MS
QTC CALCULATION (BEZET): 430 MS
R AXIS: 59 DEGREES
T AXIS: 53 DEGREES
VENTRICULAR RATE: 77 BPM

## 2019-02-06 PROCEDURE — 93005 ELECTROCARDIOGRAM TRACING: CPT

## 2019-02-06 PROCEDURE — 93010 ELECTROCARDIOGRAM REPORT: CPT | Performed by: INTERNAL MEDICINE

## 2019-04-30 PROBLEM — R22.32 MASS OF FINGER, LEFT: Status: ACTIVE | Noted: 2019-04-30

## 2019-04-30 PROBLEM — M67.442 GANGLION CYST OF FLEXOR TENDON SHEATH OF FINGER, LEFT: Status: ACTIVE | Noted: 2019-04-30

## 2019-09-10 PROCEDURE — 82085 ASSAY OF ALDOLASE: CPT | Performed by: INTERNAL MEDICINE

## 2019-11-07 PROBLEM — E04.1 THYROID NODULE: Status: ACTIVE | Noted: 2019-11-07

## 2019-12-09 ENCOUNTER — APPOINTMENT (OUTPATIENT)
Dept: GENERAL RADIOLOGY | Age: 64
End: 2019-12-09
Attending: PHYSICIAN ASSISTANT
Payer: COMMERCIAL

## 2019-12-09 ENCOUNTER — HOSPITAL ENCOUNTER (OUTPATIENT)
Age: 64
Discharge: HOME | End: 2019-12-09
Payer: COMMERCIAL

## 2019-12-09 VITALS
OXYGEN SATURATION: 99 % | RESPIRATION RATE: 17 BRPM | DIASTOLIC BLOOD PRESSURE: 100 MMHG | SYSTOLIC BLOOD PRESSURE: 140 MMHG | TEMPERATURE: 98 F | HEART RATE: 104 BPM

## 2019-12-09 DIAGNOSIS — R07.81 RIB TENDERNESS: Primary | ICD-10-CM

## 2019-12-09 PROCEDURE — 99213 OFFICE O/P EST LOW 20 MIN: CPT

## 2019-12-09 PROCEDURE — 71101 X-RAY EXAM UNILAT RIBS/CHEST: CPT | Performed by: PHYSICIAN ASSISTANT

## 2019-12-09 NOTE — ED INITIAL ASSESSMENT (HPI)
Patient presents with left upper chest/rib pain s/p fall on asphalt on Tuesday. No LOC noted. Hurts with cough. +Abrasions to left leg. Recent dx Parkinsons.

## 2019-12-09 NOTE — ED PROVIDER NOTES
Patient Seen in: [facility]      History   Patient presents with:  Trauma  Fall    Stated Complaint: RIB PAIN    HPI    CHIEF COMPLAINT: Left rib pain    HISTORY OF PRESENT ILLNESS: Debora Castillo is a pleasant 61-year-old female histor • H/O mammogram 11/16,09/15,09/14    benign   • History of endometrial biopsy 11/1502/15,02/14   • Osteoarthrosis, unspecified whether generalized or localized, unspecified site    • Pap smear for cervical cancer screening 12/16,10/15,08/13    negative   • Review of Systems    Positive for stated complaint: RIB PAIN  Other systems are as noted in HPI. Constitutional and vital signs reviewed. All other systems reviewed and negative except as noted above.     Physical Exam     ED Triage Vitals [12/09/19 0 I explained to the patient that she could have an occult or hairline fracture that we do not identify on chest x-ray and that this is treated the same as an acute fracture that is identified on chest x-ray.   Patient be discharged home on Tylenol Motrin for

## 2020-01-28 PROBLEM — M54.42 ACUTE RIGHT-SIDED LOW BACK PAIN WITH LEFT-SIDED SCIATICA: Status: ACTIVE | Noted: 2020-01-28

## 2020-02-06 PROBLEM — G89.29 CHRONIC LEFT-SIDED LOW BACK PAIN WITH LEFT-SIDED SCIATICA: Status: ACTIVE | Noted: 2020-02-06

## 2020-02-06 PROBLEM — M54.42 CHRONIC LEFT-SIDED LOW BACK PAIN WITH LEFT-SIDED SCIATICA: Status: ACTIVE | Noted: 2020-02-06

## 2020-12-10 ENCOUNTER — OFFICE VISIT (OUTPATIENT)
Dept: OBGYN CLINIC | Facility: CLINIC | Age: 65
End: 2020-12-10
Payer: COMMERCIAL

## 2020-12-10 VITALS
DIASTOLIC BLOOD PRESSURE: 68 MMHG | WEIGHT: 150 LBS | HEART RATE: 89 BPM | HEIGHT: 68 IN | SYSTOLIC BLOOD PRESSURE: 102 MMHG | BODY MASS INDEX: 22.73 KG/M2

## 2020-12-10 DIAGNOSIS — Z01.419 WELL WOMAN EXAM WITH ROUTINE GYNECOLOGICAL EXAM: ICD-10-CM

## 2020-12-10 DIAGNOSIS — Z12.31 VISIT FOR SCREENING MAMMOGRAM: ICD-10-CM

## 2020-12-10 DIAGNOSIS — Z12.31 ENCOUNTER FOR SCREENING MAMMOGRAM FOR BREAST CANCER: Primary | ICD-10-CM

## 2020-12-10 DIAGNOSIS — Z12.4 PAPANICOLAOU SMEAR FOR CERVICAL CANCER SCREENING: ICD-10-CM

## 2020-12-10 PROCEDURE — 3074F SYST BP LT 130 MM HG: CPT | Performed by: OBSTETRICS & GYNECOLOGY

## 2020-12-10 PROCEDURE — 3078F DIAST BP <80 MM HG: CPT | Performed by: OBSTETRICS & GYNECOLOGY

## 2020-12-10 PROCEDURE — 3008F BODY MASS INDEX DOCD: CPT | Performed by: OBSTETRICS & GYNECOLOGY

## 2020-12-10 PROCEDURE — 99386 PREV VISIT NEW AGE 40-64: CPT | Performed by: OBSTETRICS & GYNECOLOGY
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
## 2021-01-05 ENCOUNTER — HOSPITAL ENCOUNTER (OUTPATIENT)
Dept: MAMMOGRAPHY | Age: 66
Discharge: HOME | End: 2021-01-05
Attending: OBSTETRICS & GYNECOLOGY
Payer: COMMERCIAL

## 2021-01-05 DIAGNOSIS — Z12.31 ENCOUNTER FOR SCREENING MAMMOGRAM FOR BREAST CANCER: ICD-10-CM

## 2021-01-05 PROCEDURE — 77063 BREAST TOMOSYNTHESIS BI: CPT | Performed by: OBSTETRICS & GYNECOLOGY

## 2021-01-05 PROCEDURE — 77067 SCR MAMMO BI INCL CAD: CPT | Performed by: OBSTETRICS & GYNECOLOGY

## 2021-01-23 ENCOUNTER — LAB ENCOUNTER (OUTPATIENT)
Dept: LAB | Facility: HOSPITAL | Age: 66
End: 2021-01-23
Attending: OTOLARYNGOLOGY
Payer: COMMERCIAL

## 2021-01-23 DIAGNOSIS — Z11.59 ENCOUNTER FOR SCREENING FOR OTHER VIRAL DISEASES: ICD-10-CM

## 2021-01-23 DIAGNOSIS — Z01.818 PREPROCEDURAL EXAMINATION: ICD-10-CM

## 2021-01-24 LAB — SARS-COV-2 RNA RESP QL NAA+PROBE: NOT DETECTED

## 2021-01-26 ENCOUNTER — HOSPITAL ENCOUNTER (OUTPATIENT)
Dept: ULTRASOUND IMAGING | Facility: HOSPITAL | Age: 66
Discharge: HOME | End: 2021-01-26
Attending: OTOLARYNGOLOGY
Payer: COMMERCIAL

## 2021-01-26 DIAGNOSIS — E04.1 THYROID NODULE: ICD-10-CM

## 2021-01-26 PROCEDURE — 88173 CYTOPATH EVAL FNA REPORT: CPT | Performed by: OTOLARYNGOLOGY

## 2021-01-26 PROCEDURE — 10005 FNA BX W/US GDN 1ST LES: CPT | Performed by: OTOLARYNGOLOGY

## 2021-01-26 NOTE — PROCEDURES
PROCEDURE: US FNA THYROID, GUIDED INCLD, FIRST LESION (CPT=10005)    COMPARISON: None.     INDICATIONS: E04.1 Thyroid nodule    DESCRIPTION: The risks, benefits, and alternatives of the procedure were explained to the patient and witnessed informed written

## 2021-02-01 NOTE — PROGRESS NOTES
Please call and let her know that her biopsy was benign. Would repeat an ultrasound in one year. Please order.

## 2021-02-01 NOTE — PROGRESS NOTES
LMTCB to inform pt per RB,     \"Please call and let her know that her biopsy was benign. Would repeat an ultrasound in one year. Please order. \"    Future order placed.

## 2021-05-22 ENCOUNTER — APPOINTMENT (OUTPATIENT)
Dept: GENERAL RADIOLOGY | Age: 66
End: 2021-05-22
Attending: NURSE PRACTITIONER
Payer: COMMERCIAL

## 2021-05-22 ENCOUNTER — HOSPITAL ENCOUNTER (OUTPATIENT)
Age: 66
Discharge: HOME | End: 2021-05-22
Payer: COMMERCIAL

## 2021-05-22 VITALS
RESPIRATION RATE: 18 BRPM | SYSTOLIC BLOOD PRESSURE: 139 MMHG | OXYGEN SATURATION: 99 % | DIASTOLIC BLOOD PRESSURE: 73 MMHG | HEART RATE: 78 BPM | TEMPERATURE: 98 F

## 2021-05-22 DIAGNOSIS — S69.90XA FINGER INJURY, INITIAL ENCOUNTER: Primary | ICD-10-CM

## 2021-05-22 PROCEDURE — 99213 OFFICE O/P EST LOW 20 MIN: CPT

## 2021-05-22 PROCEDURE — 73140 X-RAY EXAM OF FINGER(S): CPT | Performed by: NURSE PRACTITIONER

## 2021-05-22 PROCEDURE — 29130 APPL FINGER SPLINT STATIC: CPT

## 2021-05-22 NOTE — ED PROVIDER NOTES
Patient Seen in: [facility]      History   Patient presents with:  Finger Injury    Stated Complaint: RIGHT RING FINGER INJURY    HPI/Subjective: This is a 52-year-old female with below stated medical history.   Presents to immediate [facility] • OTHER SURGICAL HISTORY      cystocele repair 10 yrs ago   • VAGINAL HYSTERECTOMY N/A 5/25/2017    Procedure: VAGINAL HYSTERECTOMY;  Surgeon: Jeff Schneider MD;  Location: [address] [place] [address] MAIN OR                Social History    Tobacco Use      Smoking status:  Form normal.      Left Ear: External ear normal.      Nose: Nose normal.      Mouth/Throat:      Mouth: Mucous membranes are moist.      Pharynx: Oropharynx is clear. Eyes:      General:         Right eye: No discharge. Left eye: No discharge.       Ex instructions given. Reasons to seek emergent treatment discussed. All results, plan of care, and treatment discussed personally with patient. She agreed. All questions answered.                              Disposition and Plan     Clinical Impression

## 2021-05-22 NOTE — ED INITIAL ASSESSMENT (HPI)
Pt tripped over a michelle leash and jammed her rt 4th finger on the chair. Now painful swollen and almost mallet like.   Rings removed

## 2021-05-24 PROBLEM — M20.011 MALLET DEFORMITY OF RIGHT RING FINGER: Status: ACTIVE | Noted: 2021-05-24

## 2021-11-01 PROBLEM — G20.A1 PARKINSON'S DISEASE: Status: ACTIVE | Noted: 2021-11-01

## 2021-11-01 PROBLEM — G20.A1 PARKINSON'S DISEASE (HCC): Status: ACTIVE | Noted: 2021-11-01

## 2021-11-01 PROBLEM — G20 PARKINSON'S DISEASE: Status: ACTIVE | Noted: 2021-11-01

## 2021-11-01 PROBLEM — G20 PARKINSON'S DISEASE (HCC): Status: ACTIVE | Noted: 2021-11-01

## 2021-12-02 ENCOUNTER — OFFICE VISIT (OUTPATIENT)
Dept: FAMILY MEDICINE CLINIC | Facility: CLINIC | Age: 66
End: 2021-12-02
Payer: MEDICARE

## 2021-12-02 VITALS
BODY MASS INDEX: 24.19 KG/M2 | SYSTOLIC BLOOD PRESSURE: 114 MMHG | HEIGHT: 67 IN | WEIGHT: 154.13 LBS | RESPIRATION RATE: 16 BRPM | OXYGEN SATURATION: 98 % | HEART RATE: 84 BPM | DIASTOLIC BLOOD PRESSURE: 70 MMHG

## 2021-12-02 DIAGNOSIS — R74.8 ELEVATED LIVER ENZYMES: ICD-10-CM

## 2021-12-02 DIAGNOSIS — I10 ESSENTIAL HYPERTENSION: Primary | ICD-10-CM

## 2021-12-02 DIAGNOSIS — G20 PARKINSON'S DISEASE (HCC): ICD-10-CM

## 2021-12-02 PROBLEM — R73.03 PREDIABETES: Chronic | Status: ACTIVE | Noted: 2021-12-02

## 2021-12-02 PROCEDURE — 3074F SYST BP LT 130 MM HG: CPT | Performed by: EMERGENCY MEDICINE

## 2021-12-02 PROCEDURE — 3078F DIAST BP <80 MM HG: CPT | Performed by: EMERGENCY MEDICINE

## 2021-12-02 PROCEDURE — 99204 OFFICE O/P NEW MOD 45 MIN: CPT | Performed by: EMERGENCY MEDICINE

## 2021-12-02 PROCEDURE — 3008F BODY MASS INDEX DOCD: CPT | Performed by: EMERGENCY MEDICINE

## 2021-12-02 NOTE — PATIENT INSTRUCTIONS
Thank you for choosing [facility]  To Do:  FOR Shira MEHTA        1. Continue with Losartan -hydrochlorothiazide  2. Monitor BP once a week or as needed  3. Follow up with neurology  4. Follow up in 6 months sooner if needed  5.  Repeat blood april into your lungs.   Constipation  Constipation is a very common problem. The tips below can help:  · Drink plenty of water. · Eat foods that are high in fiber, such as fruits, vegetables, and whole grains. A fiber supplement can also help.   · Get regular e worse.  · You have severe constipation. · You have trouble sleeping. · You have problems chewing or swallowing. · You often “freeze” (are unable to move your feet) or begin having falls.   Octavio last reviewed this educational content on 3/1/2018  © 20 pressure medicine exactly as directed. Don’t skip doses. Missing doses can cause your blood pressure to get out of control. · If you do miss a dose (or doses) check with your healthcare provider about what to do.   · Don't take medicines that contain heart smoking. · Limit drinks that contain caffeine such as coffee, black or green tea, and cola to 2 per day. · Never take stimulants such as amphetamines or cocaine. These drugs can be deadly for someone with high blood pressure. · Control your stress.  Rosmery Romero

## 2021-12-02 NOTE — PROGRESS NOTES
Chief Complaint:   Patient presents with:  Establish Care: New patient     HPI:   This is a 73year old female         HYPERTENSION    ON losartan-hydrochlorothiazide  Nono CP no SOB, has chronic cough but this is not new  Compliant with all medications. Social History:  Social History    Tobacco Use      Smoking status: Former Smoker        Packs/day: 1.00        Years: 3.00        Pack years: 3        Types: Cigarettes        Quit date: 1978        Years since quittin.0      Smokeless toba Primary osteoarthritis of both first carpometacarpal joints     Trigger finger of left thumb     Abnormal electrocardiogram T inversion III & aVF (ECG) (EKG) --> Stress ECHO [5/17] normal     S/P [5/17] hysterectomy [ovaries present] for uterine prolapse - bilaterally  Sensory: Intact light touch sensation to both upper and lower extremity          Recent Results (from the past 672 hour(s))   LIPID PANEL    Collection Time: 11/08/21  8:25 AM   Result Value Ref Range    Patient Fasting?  Yes     Triglycerides 0.000 - 0.012 10ˆ3/µL    Neutrophils % 38.6 %    Lymphocytes % 46.7 %    Monocytes % 8.4 %    Eosinophils % 4.5 %    Basophils % 1.8 %    nRBC/100 WBC 0.00 %           ASSESSMENT AND PLAN:       1.  Essential hypertension  Stable okay to continue with losar

## 2021-12-10 ENCOUNTER — TELEPHONE (OUTPATIENT)
Dept: FAMILY MEDICINE CLINIC | Facility: CLINIC | Age: 66
End: 2021-12-10

## 2021-12-10 NOTE — TELEPHONE ENCOUNTER
Patient is looking for a referral for Dr. Ellen Peoples, neurologist at [address] [place] [address] for update on this situation.  Patient has appointment next Friday, it was actually today but could not go since no referral     Patient came in as

## 2021-12-10 NOTE — TELEPHONE ENCOUNTER
Referral placed, not yet approved. I did speak to pt and let her know this. I informed her I would send the referral dept a message to see about approval as she does have an appointment w/ Matt Flavin Friday the 17th.  I also gave pt number for referral dept

## 2021-12-15 NOTE — TELEPHONE ENCOUNTER
Notified the patient of the approved referral. Patient verbalized understanding. States that she already received the approval number from the referral department. Scheduled to see Dr. Billy Thorpe tomorrow. No further questions voiced at this time.

## 2021-12-15 NOTE — TELEPHONE ENCOUNTER
Neurology referral from 12/2/2021 authorized. Faxed to number listed below. Received confirmation fax.

## 2021-12-28 ENCOUNTER — OFFICE VISIT (OUTPATIENT)
Dept: OBGYN CLINIC | Facility: CLINIC | Age: 66
End: 2021-12-28
Payer: MEDICARE

## 2021-12-28 VITALS
WEIGHT: 154.38 LBS | DIASTOLIC BLOOD PRESSURE: 78 MMHG | HEIGHT: 67 IN | BODY MASS INDEX: 24.23 KG/M2 | SYSTOLIC BLOOD PRESSURE: 126 MMHG | HEART RATE: 100 BPM

## 2021-12-28 DIAGNOSIS — Z12.31 ENCOUNTER FOR SCREENING MAMMOGRAM FOR BREAST CANCER: Primary | ICD-10-CM

## 2021-12-28 DIAGNOSIS — Z01.419 WELL WOMAN EXAM WITH ROUTINE GYNECOLOGICAL EXAM: ICD-10-CM

## 2021-12-28 PROCEDURE — 3074F SYST BP LT 130 MM HG: CPT | Performed by: OBSTETRICS & GYNECOLOGY

## 2021-12-28 PROCEDURE — 3008F BODY MASS INDEX DOCD: CPT | Performed by: OBSTETRICS & GYNECOLOGY

## 2021-12-28 PROCEDURE — G0101 CA SCREEN;PELVIC/BREAST EXAM: HCPCS | Performed by: OBSTETRICS & GYNECOLOGY

## 2021-12-28 PROCEDURE — 3078F DIAST BP <80 MM HG: CPT | Performed by: OBSTETRICS & GYNECOLOGY

## 2021-12-28 NOTE — PROGRESS NOTES
Graciela Rutherford is a 73year old female [id number] No LMP recorded. (Menstrual status: Partial Hysterectomy). Patient presents with:  Wellness Visit  . Dryness pain with intercourse uses lubricant does not desire anything else. Shen glide was discussed.   Raulito Rosenthal anterior colon resection proctoscopy lysis of adhesions   • Colonoscopy  6/3/11= Normal   • Colonoscopy  1/2/18= Diverticulosis    Repeat 2028   • Colonoscopy N/A 1/2/2018    Procedure: COLONOSCOPY, POSSIBLE BIOPSY, POSSIBLE POLYPECTOMY 08033;  Surgeon:  Yazan Social History:        Marital Status and Family/Children: , 4 children, 2 siblings      Occupation/Financial Situation: housewife      Pets: 1 dog      Diet: no food allergies, concerned about blue cheese possible allergy      Exercise: 3x per week denies dysuria, incontinence, abnormal vaginal discharge, vaginal itching  Skin/Breast:  Denies any breast pain, lumps, or discharge. Neurological:  denies headaches, extremity weakness or numbness.       PHYSICAL EXAM:   Constitutional: well developed, w

## 2022-01-04 ENCOUNTER — TELEPHONE (OUTPATIENT)
Dept: FAMILY MEDICINE CLINIC | Facility: CLINIC | Age: 67
End: 2022-01-04

## 2022-01-04 NOTE — TELEPHONE ENCOUNTER
Pt is requesting orders for an MRI Hip Arth[facility] w/[facility] for [facility]iatrist to order but pt would like primary to order.

## 2022-01-11 ENCOUNTER — HOSPITAL ENCOUNTER (OUTPATIENT)
Dept: MAMMOGRAPHY | Age: 67
Discharge: HOME | End: 2022-01-11
Attending: INTERNAL MEDICINE
Payer: MEDICARE

## 2022-01-11 DIAGNOSIS — Z12.31 SCREENING MAMMOGRAM, ENCOUNTER FOR: ICD-10-CM

## 2022-01-11 DIAGNOSIS — Z00.00 ANNUAL PHYSICAL EXAM: ICD-10-CM

## 2022-01-11 PROCEDURE — 77063 BREAST TOMOSYNTHESIS BI: CPT | Performed by: INTERNAL MEDICINE

## 2022-01-11 PROCEDURE — 77067 SCR MAMMO BI INCL CAD: CPT | Performed by: INTERNAL MEDICINE

## 2022-01-27 ENCOUNTER — TELEPHONE (OUTPATIENT)
Dept: FAMILY MEDICINE CLINIC | Facility: CLINIC | Age: 67
End: 2022-01-27

## 2022-01-27 DIAGNOSIS — E04.1 THYROID NODULE: Primary | ICD-10-CM

## 2022-01-27 NOTE — TELEPHONE ENCOUNTER
Pt states she use to see Estelita Rutledge with [facility] for her Thyroid, pt wondering if Govind Ivey will give her a referral to see him again, or if she has someone else she'd like the pt to see, and/or if pt should see Govind Ivey.

## 2022-02-01 ENCOUNTER — PATIENT MESSAGE (OUTPATIENT)
Dept: FAMILY MEDICINE CLINIC | Facility: CLINIC | Age: 67
End: 2022-02-01

## 2022-02-01 ENCOUNTER — LAB ENCOUNTER (OUTPATIENT)
Dept: LAB | Age: 67
End: 2022-02-01
Attending: EMERGENCY MEDICINE
Payer: MEDICARE

## 2022-02-01 DIAGNOSIS — R74.8 ELEVATED LIVER ENZYMES: ICD-10-CM

## 2022-02-01 LAB
ALBUMIN SERPL-MCNC: 3.4 G/DL (ref 3.4–5)
ALBUMIN/GLOB SERPL: 1.1 {RATIO} (ref 1–2)
ALP LIVER SERPL-CCNC: 79 U/L
ALT SERPL-CCNC: 17 U/L
ANION GAP SERPL CALC-SCNC: 5 MMOL/L (ref 0–18)
AST SERPL-CCNC: 18 U/L (ref 15–37)
BILIRUB SERPL-MCNC: 0.4 MG/DL (ref 0.1–2)
BUN BLD-MCNC: 21 MG/DL (ref 7–18)
CALCIUM BLD-MCNC: 9.3 MG/DL (ref 8.5–10.1)
CHLORIDE SERPL-SCNC: 108 MMOL/L (ref 98–112)
CO2 SERPL-SCNC: 28 MMOL/L (ref 21–32)
CREAT BLD-MCNC: 0.77 MG/DL
FASTING STATUS PATIENT QL REPORTED: YES
GLOBULIN PLAS-MCNC: 3 G/DL (ref 2.8–4.4)
GLUCOSE BLD-MCNC: 103 MG/DL (ref 70–99)
OSMOLALITY SERPL CALC.SUM OF ELEC: 295 MOSM/KG (ref 275–295)
POTASSIUM SERPL-SCNC: 4.2 MMOL/L (ref 3.5–5.1)
PROT SERPL-MCNC: 6.4 G/DL (ref 6.4–8.2)
SODIUM SERPL-SCNC: 141 MMOL/L (ref 136–145)

## 2022-02-01 PROCEDURE — 36415 COLL VENOUS BLD VENIPUNCTURE: CPT

## 2022-02-01 PROCEDURE — 80053 COMPREHEN METABOLIC PANEL: CPT

## 2022-02-04 ENCOUNTER — TELEPHONE (OUTPATIENT)
Dept: FAMILY MEDICINE CLINIC | Facility: CLINIC | Age: 67
End: 2022-02-04

## 2022-02-17 ENCOUNTER — TELEPHONE (OUTPATIENT)
Dept: FAMILY MEDICINE CLINIC | Facility: CLINIC | Age: 67
End: 2022-02-17

## 2022-04-05 ENCOUNTER — TELEPHONE (OUTPATIENT)
Dept: FAMILY MEDICINE CLINIC | Facility: CLINIC | Age: 67
End: 2022-04-05

## 2022-04-05 NOTE — TELEPHONE ENCOUNTER
Pt states during conversation with [facility] [employer], she was told that her OV 1/24/22 and 3/25/22 \"weren't authorized\" with Danielle Brooms. I asked pt what exactly did she mean by not auth. Pt stated Ilana told her those claims were denied. I informed pt there was two different referrals in her chart for  and both were auth and gave her the dates of auth, pt still insisted that her claim were denied, she did not know further what that meant, she said Ilana told her to call our office.

## 2022-04-06 NOTE — TELEPHONE ENCOUNTER
Called the patient for more information. Patient states that she called [employer] regarding a separate issue. While on the phone, the representative stated that she would look to see if there is anything else that was denied (originally called about a denied test). Patient states that she was told that two visits with Dr. Corbin Schaffer were denied. Explained to the patient that there are two referrals in the system that were placed for Dr. Corbin Schaffer and authorized by her insurance. Patient verbalized understanding, but stated that she saw Dr. Corbin Schaffer in 1/2022 and 3/2022 and those visits were not authorized. Explained to the patient that the referral that was placed on 12/5/2021 was authorized and authorized service dates were 12/13/2021 through 10/9/2022. Patient verbalized understanding. States that she has not received a bill for either visit, so she does not understand what the representative was saying. States that she will call [employer] back and if there is still an issue that needs to be addressed then she will call the office back.

## 2022-04-18 ENCOUNTER — TELEPHONE (OUTPATIENT)
Dept: FAMILY MEDICINE CLINIC | Facility: CLINIC | Age: 67
End: 2022-04-18

## 2022-04-18 NOTE — TELEPHONE ENCOUNTER
Patient has appointment with Dr. Guevara Oneal on Wednesday for the tear in her hip     She needs a referral asap     Can this be done ?      She had referral for physiatrist and that is not working, so she made appt for ortho

## 2022-04-21 ENCOUNTER — OFFICE VISIT (OUTPATIENT)
Dept: FAMILY MEDICINE CLINIC | Facility: CLINIC | Age: 67
End: 2022-04-21
Payer: MEDICARE

## 2022-04-21 VITALS
OXYGEN SATURATION: 97 % | HEART RATE: 81 BPM | DIASTOLIC BLOOD PRESSURE: 78 MMHG | BODY MASS INDEX: 24 KG/M2 | RESPIRATION RATE: 15 BRPM | WEIGHT: 154 LBS | SYSTOLIC BLOOD PRESSURE: 136 MMHG

## 2022-04-21 DIAGNOSIS — G20 PARKINSON'S DISEASE (HCC): ICD-10-CM

## 2022-04-21 DIAGNOSIS — M25.552 CHRONIC LEFT HIP PAIN: ICD-10-CM

## 2022-04-21 DIAGNOSIS — Z12.83 SKIN CANCER SCREENING: ICD-10-CM

## 2022-04-21 DIAGNOSIS — I10 ESSENTIAL HYPERTENSION: ICD-10-CM

## 2022-04-21 DIAGNOSIS — S73.192A TEAR OF LEFT ACETABULAR LABRUM, INITIAL ENCOUNTER: Primary | ICD-10-CM

## 2022-04-21 DIAGNOSIS — G89.29 CHRONIC LEFT HIP PAIN: ICD-10-CM

## 2022-04-21 PROBLEM — J41.0 SMOKERS' COUGH (HCC): Chronic | Status: ACTIVE | Noted: 2022-04-21

## 2022-04-21 PROCEDURE — 3075F SYST BP GE 130 - 139MM HG: CPT | Performed by: EMERGENCY MEDICINE

## 2022-04-21 PROCEDURE — 3078F DIAST BP <80 MM HG: CPT | Performed by: EMERGENCY MEDICINE

## 2022-04-21 PROCEDURE — 99214 OFFICE O/P EST MOD 30 MIN: CPT | Performed by: EMERGENCY MEDICINE

## 2022-04-21 RX ORDER — LOSARTAN POTASSIUM 50 MG/1
50 TABLET ORAL DAILY
Qty: 90 TABLET | Refills: 1 | Status: SHIPPED | OUTPATIENT
Start: 2022-04-21

## 2022-04-26 ENCOUNTER — TELEPHONE (OUTPATIENT)
Dept: FAMILY MEDICINE CLINIC | Facility: CLINIC | Age: 67
End: 2022-04-26

## 2022-04-26 NOTE — TELEPHONE ENCOUNTER
Patient was referred to [facility] doctor and now is being referred to a surgeon     Please call to discuss     The doctor she is seeing does not do a specific procedure

## 2022-04-27 NOTE — TELEPHONE ENCOUNTER
Spoke to patient and has pain in her hip for long time possibly 2 years. She went to ortho and was told it is not her hip, Xrays unremarkable. Thought it was her back mild to moderate stenosis but nothing that should be causing the pain. Pain is becoming more unbearable and pt is having problems sleeping because of this. She has tried PT and even saw a physiatry. Told slight tear in labrum    Tried meloxicam but did not help at all. Also had steroid shot in bursa joint but did not help at all    Now ortho wants her to see surgeon but pt is hesitant to want to go because she is not convinced that the labrum is the cause.     She is looking for your opinion

## 2022-04-28 NOTE — TELEPHONE ENCOUNTER
Spoke to patient at length. She's been referred to [address] [place] [address] with [facility]. I encouraged her to follow up with him, verbalized understanding.

## 2022-05-12 ENCOUNTER — TELEPHONE (OUTPATIENT)
Dept: ADMINISTRATIVE | Age: 67
End: 2022-05-12

## 2022-05-12 NOTE — TELEPHONE ENCOUNTER
Patient requested a new referral to see [facility],Please review and sign plan and care if you agree Thank you. Aaliyah Bentley V      [facility] REFERRALS.

## 2022-05-17 ENCOUNTER — TELEPHONE (OUTPATIENT)
Dept: FAMILY MEDICINE CLINIC | Facility: CLINIC | Age: 67
End: 2022-05-17

## 2022-06-08 ENCOUNTER — MED REC SCAN ONLY (OUTPATIENT)
Dept: FAMILY MEDICINE CLINIC | Facility: CLINIC | Age: 67
End: 2022-06-08

## 2022-07-14 ENCOUNTER — OFFICE VISIT (OUTPATIENT)
Dept: FAMILY MEDICINE CLINIC | Facility: CLINIC | Age: 67
End: 2022-07-14
Payer: MEDICARE

## 2022-07-14 VITALS
DIASTOLIC BLOOD PRESSURE: 62 MMHG | WEIGHT: 155.13 LBS | HEIGHT: 67 IN | OXYGEN SATURATION: 98 % | BODY MASS INDEX: 24.35 KG/M2 | SYSTOLIC BLOOD PRESSURE: 108 MMHG | HEART RATE: 90 BPM | RESPIRATION RATE: 16 BRPM

## 2022-07-14 DIAGNOSIS — M20.011 MALLET DEFORMITY OF RIGHT RING FINGER: ICD-10-CM

## 2022-07-14 DIAGNOSIS — I10 ESSENTIAL HYPERTENSION: ICD-10-CM

## 2022-07-14 DIAGNOSIS — Z13.29 SCREENING FOR ENDOCRINE, NUTRITIONAL, METABOLIC AND IMMUNITY DISORDER: ICD-10-CM

## 2022-07-14 DIAGNOSIS — Z00.00 ENCOUNTER FOR ANNUAL HEALTH EXAMINATION: Primary | ICD-10-CM

## 2022-07-14 DIAGNOSIS — G20 PARKINSON'S DISEASE (HCC): ICD-10-CM

## 2022-07-14 DIAGNOSIS — M72.2 PLANTAR FASCIITIS: ICD-10-CM

## 2022-07-14 DIAGNOSIS — J31.0 RHINITIS, UNSPECIFIED TYPE: ICD-10-CM

## 2022-07-14 DIAGNOSIS — Z13.0 SCREENING FOR ENDOCRINE, NUTRITIONAL, METABOLIC AND IMMUNITY DISORDER: ICD-10-CM

## 2022-07-14 DIAGNOSIS — Z13.228 SCREENING FOR ENDOCRINE, NUTRITIONAL, METABOLIC AND IMMUNITY DISORDER: ICD-10-CM

## 2022-07-14 DIAGNOSIS — Z13.21 SCREENING FOR ENDOCRINE, NUTRITIONAL, METABOLIC AND IMMUNITY DISORDER: ICD-10-CM

## 2022-07-14 PROBLEM — R29.3 POOR POSTURE: Status: RESOLVED | Noted: 2018-03-21 | Resolved: 2022-07-14

## 2022-07-14 PROCEDURE — 96160 PT-FOCUSED HLTH RISK ASSMT: CPT | Performed by: EMERGENCY MEDICINE

## 2022-07-14 PROCEDURE — 3008F BODY MASS INDEX DOCD: CPT | Performed by: EMERGENCY MEDICINE

## 2022-07-14 PROCEDURE — G0402 INITIAL PREVENTIVE EXAM: HCPCS | Performed by: EMERGENCY MEDICINE

## 2022-07-14 PROCEDURE — 3074F SYST BP LT 130 MM HG: CPT | Performed by: EMERGENCY MEDICINE

## 2022-07-14 PROCEDURE — 99397 PER PM REEVAL EST PAT 65+ YR: CPT | Performed by: EMERGENCY MEDICINE

## 2022-07-14 PROCEDURE — 3078F DIAST BP <80 MM HG: CPT | Performed by: EMERGENCY MEDICINE

## 2022-07-14 RX ORDER — FLUTICASONE PROPIONATE 50 MCG
2 SPRAY, SUSPENSION (ML) NASAL DAILY
Qty: 16 G | Refills: 2 | Status: SHIPPED | OUTPATIENT
Start: 2022-07-14 | End: 2022-08-13

## 2022-07-28 ENCOUNTER — TELEPHONE (OUTPATIENT)
Dept: FAMILY MEDICINE CLINIC | Facility: CLINIC | Age: 67
End: 2022-07-28

## 2022-07-28 DIAGNOSIS — M72.2 PLANTAR FASCIITIS: Primary | ICD-10-CM

## 2022-07-28 NOTE — TELEPHONE ENCOUNTER
Clinic Care Coordination Contact  [facility]/Voicemail       Clinical Data: Care Coordinator Outreach  Outreach attempted x 1.  Left message on voicemail with call back information and requested return call.  Plan:  Care Coordinator will try to reach patient again in 3-5 business days.  Social Sapphire Yang  [facility]  [email or web address]  [phone]    Clinic Care Coordination Contact  Care Team Conversations  Message from patient that he was at [facility] and [facility] and would talk to CCC later.    Patient has PCP appointment on 7-27.    Plan-call patient in 3-5 days to assess needs.   Social Sapphire Yang  [facility]  [email or web address]  [phone]       Spoke to pt and she has plantar fasciitis for the second time.   First time she received orthotics was OTC but she wants to now have prescription ones as she was told by her acupuncturist that she needs this kind      Minnie Allison for podiatry referral?

## 2022-08-02 ENCOUNTER — LAB ENCOUNTER (OUTPATIENT)
Dept: LAB | Age: 67
End: 2022-08-02
Attending: EMERGENCY MEDICINE
Payer: MEDICARE

## 2022-08-02 LAB
ALBUMIN SERPL-MCNC: 3.6 G/DL (ref 3.4–5)
ALBUMIN/GLOB SERPL: 1.1 {RATIO} (ref 1–2)
ALP LIVER SERPL-CCNC: 94 U/L
ALT SERPL-CCNC: 21 U/L
ANION GAP SERPL CALC-SCNC: 3 MMOL/L (ref 0–18)
AST SERPL-CCNC: 18 U/L (ref 15–37)
BASOPHILS # BLD AUTO: 0.1 X10(3) UL (ref 0–0.2)
BASOPHILS NFR BLD AUTO: 1.8 %
BILIRUB SERPL-MCNC: 0.6 MG/DL (ref 0.1–2)
BUN BLD-MCNC: 20 MG/DL (ref 7–18)
CALCIUM BLD-MCNC: 9.3 MG/DL (ref 8.5–10.1)
CHLORIDE SERPL-SCNC: 108 MMOL/L (ref 98–112)
CHOLEST SERPL-MCNC: 206 MG/DL (ref ?–200)
CO2 SERPL-SCNC: 29 MMOL/L (ref 21–32)
CREAT BLD-MCNC: 0.74 MG/DL
EOSINOPHIL # BLD AUTO: 0.14 X10(3) UL (ref 0–0.7)
EOSINOPHIL NFR BLD AUTO: 2.6 %
ERYTHROCYTE [DISTWIDTH] IN BLOOD BY AUTOMATED COUNT: 13.2 %
FASTING PATIENT LIPID ANSWER: YES
FASTING STATUS PATIENT QL REPORTED: YES
GFR SERPLBLD BASED ON 1.73 SQ M-ARVRAT: 90 ML/MIN/1.73M2 (ref 60–?)
GLOBULIN PLAS-MCNC: 3.3 G/DL (ref 2.8–4.4)
GLUCOSE BLD-MCNC: 102 MG/DL (ref 70–99)
HCT VFR BLD AUTO: 43.9 %
HDLC SERPL-MCNC: 55 MG/DL (ref 40–59)
HGB BLD-MCNC: 13.7 G/DL
IMM GRANULOCYTES # BLD AUTO: 0.02 X10(3) UL (ref 0–1)
IMM GRANULOCYTES NFR BLD: 0.4 %
LDLC SERPL CALC-MCNC: 132 MG/DL (ref ?–100)
LYMPHOCYTES # BLD AUTO: 1.96 X10(3) UL (ref 1–4)
LYMPHOCYTES NFR BLD AUTO: 35.9 %
MCH RBC QN AUTO: 30.4 PG (ref 26–34)
MCHC RBC AUTO-ENTMCNC: 31.2 G/DL (ref 31–37)
MCV RBC AUTO: 97.6 FL
MONOCYTES # BLD AUTO: 0.46 X10(3) UL (ref 0.1–1)
MONOCYTES NFR BLD AUTO: 8.4 %
NEUTROPHILS # BLD AUTO: 2.78 X10 (3) UL (ref 1.5–7.7)
NEUTROPHILS # BLD AUTO: 2.78 X10(3) UL (ref 1.5–7.7)
NEUTROPHILS NFR BLD AUTO: 50.9 %
NONHDLC SERPL-MCNC: 151 MG/DL (ref ?–130)
OSMOLALITY SERPL CALC.SUM OF ELEC: 293 MOSM/KG (ref 275–295)
PLATELET # BLD AUTO: 278 10(3)UL (ref 150–450)
POTASSIUM SERPL-SCNC: 4 MMOL/L (ref 3.5–5.1)
PROT SERPL-MCNC: 6.9 G/DL (ref 6.4–8.2)
RBC # BLD AUTO: 4.5 X10(6)UL
SODIUM SERPL-SCNC: 140 MMOL/L (ref 136–145)
TRIGL SERPL-MCNC: 109 MG/DL (ref 30–149)
TSI SER-ACNC: 0.99 MIU/ML (ref 0.36–3.74)
VLDLC SERPL CALC-MCNC: 20 MG/DL (ref 0–30)
WBC # BLD AUTO: 5.5 X10(3) UL (ref 4–11)

## 2022-08-04 ENCOUNTER — TELEPHONE (OUTPATIENT)
Dept: FAMILY MEDICINE CLINIC | Facility: CLINIC | Age: 67
End: 2022-08-04

## 2022-08-04 DIAGNOSIS — R73.01 ELEVATED FASTING GLUCOSE: Primary | ICD-10-CM

## 2022-08-04 LAB
EST. AVERAGE GLUCOSE BLD GHB EST-MCNC: 114 MG/DL (ref 68–126)
HBA1C MFR BLD: 5.6 % (ref ?–5.7)

## 2022-08-04 NOTE — TELEPHONE ENCOUNTER
Pt calling regarding referral for podiatrist. Pt wants to know why she was referred to [facility] and they told her she needs a copy of the referral since they can not see it in the system. I told pt it has not been authorized yet. Pt wants to know what to do because she cannot walk due to the plantar fasciitis.

## 2022-08-04 NOTE — TELEPHONE ENCOUNTER
VM left for patient.  I gave her the number for the referral department to follow up on the referral.

## 2022-08-05 NOTE — PROGRESS NOTES
Taina notified:  See my chart message to patient. The patient's ASCVD 10 year risk score is 5.4. Dear Rebeca Alvarado,  Your labs are normal except your lipid panel and glucose are elevated. Hemoglobin A1c has been added onto existing blood. This is to screen for prediabetes. 10-year risk of heart attack per ASCVD score is 5.4, which is low-moderate risk. Treatment for mild to moderate abnormal cholesterol and lipids is best managed  initially with lifestyle changes, improving diet and increasing physical activity. Recommendations for exercise are 3-5 times weekly for 30-60 minutes for a minimum of 150-300 minutes. This will improve your cholesterol levels and strengthen your heart. If you are overweight, then losing weight may also improve your lipid profile. The Mediterranean diet is recommended and contains foods high in omega-3's and alpha linoleic acid; this helps improve your good cholesterol and may lower bad cholesterol. Eat salmon 2x weekly, consume moderate vegetables,lean meats/fish, nuts and healthy fats i.e. almonds, walnuts, flaxseed, hempseed, avocados, avocado or olive oil, spinach, green beans, organic strawberries, etc. Please avoid fried food or limit to 1 x monthly along with pizza and other foods that are high in saturated animal fats. Monitoring your cholesterol and lipid profile is recommended annually sooner as as directed by your doctor. Please call our office  if you have any further question or schedule an appointment.     Sincerely,  -covering physician for Dr. Aden Perry

## 2022-08-08 ENCOUNTER — TELEPHONE (OUTPATIENT)
Dept: ADMINISTRATIVE | Age: 67
End: 2022-08-08

## 2022-08-08 DIAGNOSIS — M72.2 PLANTAR FASCIITIS: Primary | ICD-10-CM

## 2022-08-08 NOTE — TELEPHONE ENCOUNTER
Patient requested a new referral to see Francisca Love podiatry is Out of her network,Please review and sign plan and care if you agree Thank you. Silvino Gomez    [facility] REFERRALS.

## 2022-09-27 DIAGNOSIS — I10 ESSENTIAL HYPERTENSION: ICD-10-CM

## 2022-09-27 RX ORDER — LOSARTAN POTASSIUM 50 MG/1
TABLET ORAL
Qty: 90 TABLET | Refills: 1 | Status: SHIPPED | OUTPATIENT
Start: 2022-09-27

## 2022-10-17 ENCOUNTER — TELEPHONE (OUTPATIENT)
Dept: FAMILY MEDICINE CLINIC | Facility: CLINIC | Age: 67
End: 2022-10-17

## 2022-10-17 DIAGNOSIS — Z12.83 SKIN CANCER SCREENING: Primary | ICD-10-CM

## 2022-10-17 NOTE — TELEPHONE ENCOUNTER
Patient states she needs a new referral. She called Dr Jurgen Valencia and she isnt accepting any new patients.      Please advise

## 2022-10-17 NOTE — TELEPHONE ENCOUNTER
Called pt and informed referral for Dr. Jhonny Vargas placed. Pt states she already has referral information. All questions answered and pt verbalized understanding.

## 2023-01-04 ENCOUNTER — TELEPHONE (OUTPATIENT)
Dept: FAMILY MEDICINE CLINIC | Facility: CLINIC | Age: 68
End: 2023-01-04

## 2023-01-04 NOTE — TELEPHONE ENCOUNTER
Patient is looking for a dermatologist close to her home in [facility].      Please Advise

## 2023-01-13 ENCOUNTER — HOSPITAL ENCOUNTER (OUTPATIENT)
Dept: MAMMOGRAPHY | Age: 68
Discharge: HOME | End: 2023-01-13
Attending: EMERGENCY MEDICINE
Payer: MEDICARE

## 2023-01-13 DIAGNOSIS — Z12.31 ENCOUNTER FOR SCREENING MAMMOGRAM FOR MALIGNANT NEOPLASM OF BREAST: ICD-10-CM

## 2023-01-13 PROCEDURE — 77067 SCR MAMMO BI INCL CAD: CPT | Performed by: EMERGENCY MEDICINE

## 2023-01-13 PROCEDURE — 77063 BREAST TOMOSYNTHESIS BI: CPT | Performed by: EMERGENCY MEDICINE

## 2023-01-16 ENCOUNTER — TELEPHONE (OUTPATIENT)
Dept: FAMILY MEDICINE CLINIC | Facility: CLINIC | Age: 68
End: 2023-01-16

## 2023-01-16 DIAGNOSIS — S73.192A TEAR OF LEFT ACETABULAR LABRUM, INITIAL ENCOUNTER: Primary | ICD-10-CM

## 2023-01-16 NOTE — TELEPHONE ENCOUNTER
Patient is asking Dr Jude Lobo for a referral to see Brayan Beard. She stated she thought we had a referral put in already for her to see him. He is a [facility].  She states she needs Surgery    Please Advise

## 2023-01-16 NOTE — TELEPHONE ENCOUNTER
PSR: Please advise on reason for referral. Surgery on what body part? Where is the specialist located?

## 2023-01-30 ENCOUNTER — APPOINTMENT (OUTPATIENT)
Dept: URBAN - METROPOLITAN AREA CLINIC 247 | Age: 68
Setting detail: DERMATOLOGY
End: 2023-01-31

## 2023-01-30 ENCOUNTER — TELEPHONE (OUTPATIENT)
Dept: FAMILY MEDICINE CLINIC | Facility: CLINIC | Age: 68
End: 2023-01-30

## 2023-01-30 DIAGNOSIS — I10 ESSENTIAL HYPERTENSION: ICD-10-CM

## 2023-01-30 DIAGNOSIS — L82.1 OTHER SEBORRHEIC KERATOSIS: ICD-10-CM

## 2023-01-30 DIAGNOSIS — D22 MELANOCYTIC NEVI: ICD-10-CM

## 2023-01-30 DIAGNOSIS — D18.0 HEMANGIOMA: ICD-10-CM

## 2023-01-30 DIAGNOSIS — L57.8 OTHER SKIN CHANGES DUE TO CHRONIC EXPOSURE TO NONIONIZING RADIATION: ICD-10-CM

## 2023-01-30 DIAGNOSIS — L91.8 OTHER HYPERTROPHIC DISORDERS OF THE SKIN: ICD-10-CM

## 2023-01-30 PROBLEM — D18.01 HEMANGIOMA OF SKIN AND SUBCUTANEOUS TISSUE: Status: ACTIVE | Noted: 2023-01-30

## 2023-01-30 PROBLEM — D22.39 MELANOCYTIC NEVI OF OTHER PARTS OF FACE: Status: ACTIVE | Noted: 2023-01-30

## 2023-01-30 PROCEDURE — OTHER COUNSELING: OTHER

## 2023-01-30 PROCEDURE — 99203 OFFICE O/P NEW LOW 30 MIN: CPT

## 2023-01-30 PROCEDURE — OTHER MIPS QUALITY: OTHER

## 2023-01-30 RX ORDER — LOSARTAN POTASSIUM 50 MG/1
50 TABLET ORAL DAILY
Qty: 90 TABLET | Refills: 1 | Status: SHIPPED | OUTPATIENT
Start: 2023-01-30

## 2023-01-30 ASSESSMENT — LOCATION SIMPLE DESCRIPTION DERM
LOCATION SIMPLE: RIGHT CHEEK
LOCATION SIMPLE: LEFT ANTERIOR NECK
LOCATION SIMPLE: LEFT UPPER BACK
LOCATION SIMPLE: LEFT CHEEK
LOCATION SIMPLE: CHEST

## 2023-01-30 ASSESSMENT — LOCATION ZONE DERM
LOCATION ZONE: TRUNK
LOCATION ZONE: NECK
LOCATION ZONE: FACE

## 2023-01-30 ASSESSMENT — LOCATION DETAILED DESCRIPTION DERM
LOCATION DETAILED: LEFT MEDIAL UPPER BACK
LOCATION DETAILED: LEFT SUPERIOR MEDIAL UPPER BACK
LOCATION DETAILED: RIGHT CENTRAL MALAR CHEEK
LOCATION DETAILED: UPPER STERNUM
LOCATION DETAILED: RIGHT MEDIAL SUPERIOR CHEST
LOCATION DETAILED: LEFT INFERIOR MEDIAL MALAR CHEEK
LOCATION DETAILED: LEFT INFERIOR ANTERIOR NECK

## 2023-01-30 NOTE — TELEPHONE ENCOUNTER
Rcvd refill request from Mikaela for Losartan. Pt states she will now be using [facility] pharmacy.

## 2023-02-06 ENCOUNTER — TELEPHONE (OUTPATIENT)
Dept: FAMILY MEDICINE CLINIC | Facility: CLINIC | Age: 68
End: 2023-02-06

## 2023-02-06 DIAGNOSIS — Z63.8 FAMILY CONFLICT: Primary | ICD-10-CM

## 2023-02-06 SDOH — SOCIAL STABILITY - SOCIAL INSECURITY: OTHER SPECIFIED PROBLEMS RELATED TO PRIMARY SUPPORT GROUP: Z63.8

## 2023-02-07 NOTE — TELEPHONE ENCOUNTER
Called pt and discussed referral for [facility] navigator. Patient verbalized understanding.

## 2023-02-13 ENCOUNTER — OFFICE VISIT (OUTPATIENT)
Dept: FAMILY MEDICINE CLINIC | Facility: CLINIC | Age: 68
End: 2023-02-13
Payer: MEDICARE

## 2023-02-13 VITALS
DIASTOLIC BLOOD PRESSURE: 90 MMHG | WEIGHT: 159 LBS | BODY MASS INDEX: 24.96 KG/M2 | OXYGEN SATURATION: 98 % | SYSTOLIC BLOOD PRESSURE: 126 MMHG | HEIGHT: 67 IN | HEART RATE: 81 BPM | RESPIRATION RATE: 16 BRPM | TEMPERATURE: 98 F

## 2023-02-13 DIAGNOSIS — R07.89 ACUTE CHEST WALL PAIN: Primary | ICD-10-CM

## 2023-02-13 PROCEDURE — 3074F SYST BP LT 130 MM HG: CPT | Performed by: FAMILY MEDICINE

## 2023-02-13 PROCEDURE — 3080F DIAST BP >= 90 MM HG: CPT | Performed by: FAMILY MEDICINE

## 2023-02-13 PROCEDURE — 99213 OFFICE O/P EST LOW 20 MIN: CPT | Performed by: FAMILY MEDICINE

## 2023-02-13 PROCEDURE — 3008F BODY MASS INDEX DOCD: CPT | Performed by: FAMILY MEDICINE

## 2023-02-15 ENCOUNTER — TELEPHONE (OUTPATIENT)
Dept: FAMILY MEDICINE CLINIC | Facility: CLINIC | Age: 68
End: 2023-02-15

## 2023-02-15 NOTE — TELEPHONE ENCOUNTER
VM left to of need for Dr. Kip Maddox office to place the referral as they will have documentation to substantiate the need for PT to pt's insurance.

## 2023-02-15 NOTE — TELEPHONE ENCOUNTER
Pt got a referral to Dr Vivian Marques for hip issues.  is requesting for her to do PT for hip and now needs a referral for that.      Either  jimi pan with [facility]  Or vaughn sherwood with [facility]

## 2023-02-20 ENCOUNTER — TELEPHONE (OUTPATIENT)
Dept: FAMILY MEDICINE CLINIC | Facility: CLINIC | Age: 68
End: 2023-02-20

## 2023-03-03 DIAGNOSIS — I10 ESSENTIAL HYPERTENSION: ICD-10-CM

## 2023-03-03 DIAGNOSIS — J31.0 RHINITIS, UNSPECIFIED TYPE: ICD-10-CM

## 2023-03-03 RX ORDER — LOSARTAN POTASSIUM 50 MG/1
TABLET ORAL
Qty: 90 TABLET | Refills: 1 | OUTPATIENT
Start: 2023-03-03

## 2023-03-03 RX ORDER — FLUTICASONE PROPIONATE 50 MCG
SPRAY, SUSPENSION (ML) NASAL
Qty: 48 ML | Refills: 0 | Status: SHIPPED | OUTPATIENT
Start: 2023-03-03

## 2023-03-10 ENCOUNTER — TELEPHONE (OUTPATIENT)
Dept: FAMILY MEDICINE CLINIC | Facility: CLINIC | Age: 68
End: 2023-03-10

## 2023-04-05 ENCOUNTER — TELEPHONE (OUTPATIENT)
Dept: FAMILY MEDICINE CLINIC | Facility: CLINIC | Age: 68
End: 2023-04-05

## 2023-04-05 NOTE — TELEPHONE ENCOUNTER
ANEGLICA:    Alissa Vega for a referral for a New Pelvic floor Physical therapy. She would like to stay within the [address].   She called [facility] and they told her to call back her Primary-Dr Mariee and stay within our locations.      Please Advise

## 2023-04-17 ENCOUNTER — OFFICE VISIT (OUTPATIENT)
Dept: FAMILY MEDICINE CLINIC | Facility: CLINIC | Age: 68
End: 2023-04-17
Payer: MEDICARE

## 2023-04-17 VITALS
RESPIRATION RATE: 21 BRPM | HEIGHT: 67 IN | WEIGHT: 157 LBS | SYSTOLIC BLOOD PRESSURE: 132 MMHG | DIASTOLIC BLOOD PRESSURE: 76 MMHG | HEART RATE: 85 BPM | BODY MASS INDEX: 24.64 KG/M2 | OXYGEN SATURATION: 99 %

## 2023-04-17 DIAGNOSIS — Z00.00 ENCOUNTER FOR ANNUAL HEALTH EXAMINATION: Primary | ICD-10-CM

## 2023-04-17 DIAGNOSIS — I10 ESSENTIAL HYPERTENSION: ICD-10-CM

## 2023-04-17 DIAGNOSIS — K21.9 LPRD (LARYNGOPHARYNGEAL REFLUX DISEASE): ICD-10-CM

## 2023-04-17 DIAGNOSIS — M62.89 PELVIC FLOOR DYSFUNCTION: ICD-10-CM

## 2023-04-17 DIAGNOSIS — L81.1 MELASMA: ICD-10-CM

## 2023-04-17 DIAGNOSIS — R23.2 HOT FLASHES: ICD-10-CM

## 2023-04-17 DIAGNOSIS — G20 PARKINSON'S DISEASE (HCC): ICD-10-CM

## 2023-04-17 DIAGNOSIS — Z23 NEED FOR VACCINATION FOR STREP PNEUMONIAE: ICD-10-CM

## 2023-04-17 PROBLEM — K57.92 DIVERTICULITIS: Status: RESOLVED | Noted: 2017-11-24 | Resolved: 2023-04-17

## 2023-04-17 RX ORDER — GABAPENTIN 300 MG/1
CAPSULE ORAL DAILY
Qty: 270 CAPSULE | Refills: 0 | Status: SHIPPED | OUTPATIENT
Start: 2023-04-17

## 2023-04-17 NOTE — PATIENT INSTRUCTIONS
Thank you for choosing [facility]  To Do:  FOR MONICA MEHTA    Monitor BP 1-2 x a week  Continue with losartan  Need to establish care of neurology, Dr Shruti Goldman  Follow up with pulmonology regarding chronic cough, Dr Jennifer Amaro  Follow up with dermatology regarding melasma  Use a good Vit C serum 2 x a day (ascorbic acid)  Wear sunscreen daily SPF 50  Follow up in 6 months  Follow up with Dr Milly Russell   Tests on this list are recommended by your physician but may not be covered, or covered at this frequency, by your insurer. Please check with your insurance carrier before scheduling to verify coverage.    PREVENTATIVE SERVICES FREQUENCY &  COVERAGE DETAILS LAST COMPLETION DATE   Diabetes Screening    Fasting Blood Sugar /  Glucose    One screening every 12 months if never tested or if previously tested but not diagnosed with pre-diabetes   One screening every 6 months if diagnosed with pre-diabetes Lab Results   Component Value Date    GLUCOSE 96 11/20/2014     (H) 08/02/2022        Cardiovascular Disease Screening    Lipid Panel  Cholesterol  Lipoprotein (HDL)  Triglycerides Covered every 5 years for all Medicare beneficiaries without apparent signs or symptoms of cardiovascular disease Lab Results   Component Value Date    CHOLEST 206 (H) 08/02/2022    HDL 55 08/02/2022     (H) 08/02/2022    TRIG 109 08/02/2022         Electrocardiogram (EKG)   Covered if needed at Welcome to Medicare, and non-screening if indicated for medical reasons 06/28/2019      Ultrasound Screening for Abdominal Aortic Aneurysm (AAA) Covered once in a lifetime for one of the following risk factors    Men who are 73-68 years old and have ever smoked    Anyone with a family history -     Colorectal Cancer Screening  Covered for ages 52-80; only need ONE of the following:    Colonoscopy   Covered every 10 years    Covered every 2 years if patient is at high risk or previous colonoscopy was abnormal 01/02/2018    Colorectal Cancer Screening due on 01/02/2028    Flexible Sigmoidoscopy   Covered every 4 years -    Fecal Occult Blood Test Covered annually -   Bone Density Screening    Bone density screening    Covered every 2 years after age 72 if diagnosed with risk of osteoporosis or estrogen deficiency. Covered yearly for long-term glucocorticoid medication use (Steroids) Last Dexa Scan:    DEXA BONE DENSITY AXIAL (CPT=77080) 02/18/2021      No recommendations at this time   Pap and Pelvic    Pap   Covered every 2 years for women at normal risk;  Annually if at high risk -  No recommendations at this time    Chlamydia Annually if high risk -  No recommendations at this time   Screening Mammogram    Mammogram     Recommend annually for all female patients aged 36 and older    One baseline mammogram covered for patients aged 32-38 01/13/2023    Mammogram due on 01/13/2024    Immunizations    Influenza Covered once per flu season  Please get every year -  No recommendations at this time    Pneumococcal Each vaccine (Sbyzxvx27 & Kbcerqhek56) covered once after 65 Prevnar 13: -    Wtuksylph89: 11/01/2021     Pneumococcal Vaccination(2 - PCV) due on 11/01/2022    Hepatitis B One screening covered for patients with certain risk factors   -  No recommendations at this time    Tetanus Toxoid Not covered by Medicare Part B unless medically necessary (cut with metal); may be covered with your pharmacy prescription benefits -    Tetanus, Diptheria and Pertusis TD and TDaP Not covered by Medicare Part B -  No recommendations at this time    Zoster Not covered by Medicare Part B; may be covered with your pharmacy  prescription benefits -  Zoster Vaccines(1 of 2) Never done       Annual Monitoring of Persistent Medications (ACE/ARB, digoxin diuretics, anticonvulsants)    Potassium Annually Lab Results   Component Value Date    K 4.0 08/02/2022         Creatinine   Annually Lab Results Component Value Date    CREATSERUM 0.74 08/02/2022         BUN Annually Lab Results   Component Value Date    BUN 20 (H) 08/02/2022       Drug Serum Conc Annually No results found for: DIGOXIN, DIG, VALP

## 2023-04-25 ENCOUNTER — LAB ENCOUNTER (OUTPATIENT)
Dept: LAB | Age: 68
End: 2023-04-25
Attending: EMERGENCY MEDICINE
Payer: MEDICARE

## 2023-04-25 DIAGNOSIS — R23.2 HOT FLASHES: ICD-10-CM

## 2023-04-25 LAB — CRP SERPL-MCNC: <0.29 MG/DL (ref ?–0.3)

## 2023-04-25 PROCEDURE — 87086 URINE CULTURE/COLONY COUNT: CPT

## 2023-04-25 PROCEDURE — 86140 C-REACTIVE PROTEIN: CPT

## 2023-04-25 PROCEDURE — 36415 COLL VENOUS BLD VENIPUNCTURE: CPT

## 2023-05-09 ENCOUNTER — OFFICE VISIT (OUTPATIENT)
Facility: LOCATION | Age: 68
End: 2023-05-09
Payer: MEDICARE

## 2023-05-09 DIAGNOSIS — E04.2 MULTIPLE THYROID NODULES: Primary | ICD-10-CM

## 2023-05-09 PROCEDURE — 1159F MED LIST DOCD IN RCRD: CPT | Performed by: OTOLARYNGOLOGY

## 2023-05-09 PROCEDURE — 1160F RVW MEDS BY RX/DR IN RCRD: CPT | Performed by: OTOLARYNGOLOGY

## 2023-05-09 PROCEDURE — 99203 OFFICE O/P NEW LOW 30 MIN: CPT | Performed by: OTOLARYNGOLOGY

## 2023-05-11 ENCOUNTER — TELEPHONE (OUTPATIENT)
Dept: FAMILY MEDICINE CLINIC | Facility: CLINIC | Age: 68
End: 2023-05-11

## 2023-05-11 DIAGNOSIS — G20 PARKINSON'S DISEASE (HCC): Primary | ICD-10-CM

## 2023-05-11 NOTE — TELEPHONE ENCOUNTER
Pt requesting referral to new Neurologist below. She does not feel Dr. Cata Olivares is a good fit for her (does not look like she has seen Neuro yet).   [id number] Neyda Hutchins for referral?

## 2023-05-11 NOTE — TELEPHONE ENCOUNTER
Pt called stating neurology recommendation that she was given by provider was not a good fit for her, wondering if she can get a referral sent for a neurologist the pt found  [facility]  [facility] [facility] [address] [place] [address]  [phone]   [facility]:[phone]

## 2023-05-15 NOTE — TELEPHONE ENCOUNTER
Patient is calling back stating she is needing a referral for the neurologist listed below   Jeff Hickman, [facility] HEART

## 2023-05-16 ENCOUNTER — TELEPHONE (OUTPATIENT)
Dept: FAMILY MEDICINE CLINIC | Facility: CLINIC | Age: 68
End: 2023-05-16

## 2023-05-16 DIAGNOSIS — R10.2 PELVIC PAIN: ICD-10-CM

## 2023-05-16 DIAGNOSIS — N95.2 POSTMENOPAUSAL ATROPHIC VAGINITIS: Primary | ICD-10-CM

## 2023-05-16 DIAGNOSIS — N95.1 SYMPTOMATIC MENOPAUSAL OR FEMALE CLIMACTERIC STATES: ICD-10-CM

## 2023-05-16 DIAGNOSIS — R10.2 VAGINAL PAIN: ICD-10-CM

## 2023-05-16 NOTE — TELEPHONE ENCOUNTER
Gaurav Mcpherson from Dr. Denise Champagne office called [phone], pt needs Gyne US in there office (currently schedule for 5/18) & needs Pelvic Floor therapy. Pt's insurance requires PCP to place referrals. Codes given, referrals placed in Epic. She requests auth be faxed to her at [phone]. Referrals placed in Epic. Will f/u for auth then fax to Gaurav Mcpherson.

## 2023-05-18 NOTE — TELEPHONE ENCOUNTER
Pt called stating she is not able to make appts for Yesy Bull because there is no referral placed, referral was placed 5/15. Pt states she is trying to make appt for pelvic floor therapy but states she is not able to because there is no referral placed, referral was placed 5/16. Faxed referrals over to office as well, received confirmation for both.     Pt still wondering about US

## 2023-05-19 ENCOUNTER — TELEPHONE (OUTPATIENT)
Dept: ADMINISTRATIVE | Age: 68
End: 2023-05-19

## 2023-05-19 DIAGNOSIS — G20 PARKINSON DISEASE (HCC): Primary | ICD-10-CM

## 2023-05-19 NOTE — TELEPHONE ENCOUNTER
Patient requested new referral to see Neurology  Rafaela Murphy,Please review and sign plan and care if you agree Thank you. Santiaga V/[facility] REFERRALS.

## 2023-05-29 DIAGNOSIS — J31.0 RHINITIS, UNSPECIFIED TYPE: ICD-10-CM

## 2023-05-30 RX ORDER — FLUTICASONE PROPIONATE 50 MCG
SPRAY, SUSPENSION (ML) NASAL
Qty: 48 ML | Refills: 1 | Status: SHIPPED | OUTPATIENT
Start: 2023-05-30

## 2023-06-05 ENCOUNTER — MED REC SCAN ONLY (OUTPATIENT)
Dept: FAMILY MEDICINE CLINIC | Facility: CLINIC | Age: 68
End: 2023-06-05

## 2023-06-20 ENCOUNTER — OFFICE VISIT (OUTPATIENT)
Dept: FAMILY MEDICINE CLINIC | Facility: CLINIC | Age: 68
End: 2023-06-20
Payer: MEDICARE

## 2023-06-20 VITALS
OXYGEN SATURATION: 97 % | HEIGHT: 67 IN | BODY MASS INDEX: 25.15 KG/M2 | SYSTOLIC BLOOD PRESSURE: 130 MMHG | WEIGHT: 160.25 LBS | HEART RATE: 90 BPM | DIASTOLIC BLOOD PRESSURE: 78 MMHG | RESPIRATION RATE: 16 BRPM

## 2023-06-20 DIAGNOSIS — H52.10 MYOPIA, UNSPECIFIED LATERALITY: ICD-10-CM

## 2023-06-20 DIAGNOSIS — M54.50 ACUTE LEFT-SIDED LOW BACK PAIN WITHOUT SCIATICA: Primary | ICD-10-CM

## 2023-06-20 DIAGNOSIS — G20 PARKINSON'S DISEASE (HCC): ICD-10-CM

## 2023-06-20 PROCEDURE — 99214 OFFICE O/P EST MOD 30 MIN: CPT | Performed by: EMERGENCY MEDICINE

## 2023-06-20 PROCEDURE — 3078F DIAST BP <80 MM HG: CPT | Performed by: EMERGENCY MEDICINE

## 2023-06-20 PROCEDURE — 3075F SYST BP GE 130 - 139MM HG: CPT | Performed by: EMERGENCY MEDICINE

## 2023-06-20 PROCEDURE — 1160F RVW MEDS BY RX/DR IN RCRD: CPT | Performed by: EMERGENCY MEDICINE

## 2023-06-20 PROCEDURE — 1159F MED LIST DOCD IN RCRD: CPT | Performed by: EMERGENCY MEDICINE

## 2023-06-20 PROCEDURE — 3008F BODY MASS INDEX DOCD: CPT | Performed by: EMERGENCY MEDICINE

## 2023-06-20 RX ORDER — CYCLOBENZAPRINE HCL 10 MG
10 TABLET ORAL 3 TIMES DAILY PRN
Qty: 30 TABLET | Refills: 1 | Status: SHIPPED | OUTPATIENT
Start: 2023-06-20

## 2023-06-20 RX ORDER — MELOXICAM 15 MG/1
15 TABLET ORAL DAILY
Qty: 30 TABLET | Refills: 2 | Status: SHIPPED | OUTPATIENT
Start: 2023-06-20

## 2023-06-20 NOTE — PATIENT INSTRUCTIONS
Thank you for choosing [facility]  To Do:  FOR Joselyn MEHTA    Arrange for physical therapy for back  Take muscle relaxant as needed, cyclobenzaprine  Take Meloxicam once a day as needed for pain  Activity as tolerated  Follow up with neurology as scheduled  Follow up if not better after physical therapy  Follow up in 6 months for HTN

## 2023-08-04 ENCOUNTER — OFFICE VISIT (OUTPATIENT)
Dept: FAMILY MEDICINE CLINIC | Facility: CLINIC | Age: 68
End: 2023-08-04
Payer: MEDICARE

## 2023-08-04 VITALS
HEART RATE: 64 BPM | BODY MASS INDEX: 25.43 KG/M2 | DIASTOLIC BLOOD PRESSURE: 80 MMHG | OXYGEN SATURATION: 98 % | RESPIRATION RATE: 16 BRPM | SYSTOLIC BLOOD PRESSURE: 124 MMHG | HEIGHT: 67 IN | WEIGHT: 162 LBS

## 2023-08-04 DIAGNOSIS — R31.0 GROSS HEMATURIA: Primary | ICD-10-CM

## 2023-08-04 DIAGNOSIS — N90.89 LABIAL IRRITATION: ICD-10-CM

## 2023-08-04 LAB
APPEARANCE: CLEAR
BILIRUBIN: NEGATIVE
GLUCOSE (URINE DIPSTICK): NEGATIVE MG/DL
LEUKOCYTES: NEGATIVE
MULTISTIX LOT#: NORMAL NUMERIC
NITRITE, URINE: NEGATIVE
OCCULT BLOOD: NEGATIVE
PH, URINE: 6 (ref 4.5–8)
PROTEIN (URINE DIPSTICK): NEGATIVE MG/DL
SPECIFIC GRAVITY: 1.02 (ref 1–1.03)
URINE-COLOR: YELLOW
UROBILINOGEN,SEMI-QN: 0.2 MG/DL (ref 0–1.9)

## 2023-08-04 PROCEDURE — 87086 URINE CULTURE/COLONY COUNT: CPT | Performed by: EMERGENCY MEDICINE

## 2023-08-04 PROCEDURE — 99213 OFFICE O/P EST LOW 20 MIN: CPT | Performed by: EMERGENCY MEDICINE

## 2023-08-04 PROCEDURE — 1159F MED LIST DOCD IN RCRD: CPT | Performed by: EMERGENCY MEDICINE

## 2023-08-04 PROCEDURE — 3074F SYST BP LT 130 MM HG: CPT | Performed by: EMERGENCY MEDICINE

## 2023-08-04 PROCEDURE — 3008F BODY MASS INDEX DOCD: CPT | Performed by: EMERGENCY MEDICINE

## 2023-08-04 PROCEDURE — 3079F DIAST BP 80-89 MM HG: CPT | Performed by: EMERGENCY MEDICINE

## 2023-08-04 PROCEDURE — 81003 URINALYSIS AUTO W/O SCOPE: CPT | Performed by: EMERGENCY MEDICINE

## 2023-08-04 PROCEDURE — 1160F RVW MEDS BY RX/DR IN RCRD: CPT | Performed by: EMERGENCY MEDICINE

## 2023-08-04 NOTE — PATIENT INSTRUCTIONS
Thank you for choosing [facility]  To Do:  FOR Nancy MEHTA    Arrange for kidney ultrasound  MOnitor Sx  To ER if with any worsening Symptoms  Follow up with OB re vaginal irritation    Ashley Bernal- OB/GYNE    Dr. Yin Sports      Obstetrics/Gynecology   [address]  Phone: [phone]  Fax: [phone]    [address]  [place], [address]  Phone: [phone]  Fax: [phone]

## 2023-08-08 ENCOUNTER — OFFICE VISIT (OUTPATIENT)
Dept: OBGYN CLINIC | Facility: CLINIC | Age: 68
End: 2023-08-08
Payer: MEDICARE

## 2023-08-08 VITALS
SYSTOLIC BLOOD PRESSURE: 120 MMHG | HEART RATE: 78 BPM | BODY MASS INDEX: 25.24 KG/M2 | WEIGHT: 160.81 LBS | HEIGHT: 67 IN | DIASTOLIC BLOOD PRESSURE: 76 MMHG

## 2023-08-08 DIAGNOSIS — N90.89 VULVAR IRRITATION: Primary | ICD-10-CM

## 2023-08-08 DIAGNOSIS — I10 ESSENTIAL HYPERTENSION: ICD-10-CM

## 2023-08-08 DIAGNOSIS — L81.9 SKIN HYPOPIGMENTATION: ICD-10-CM

## 2023-08-08 PROCEDURE — 99203 OFFICE O/P NEW LOW 30 MIN: CPT | Performed by: NURSE PRACTITIONER

## 2023-08-08 PROCEDURE — 1159F MED LIST DOCD IN RCRD: CPT | Performed by: NURSE PRACTITIONER

## 2023-08-08 PROCEDURE — 3078F DIAST BP <80 MM HG: CPT | Performed by: NURSE PRACTITIONER

## 2023-08-08 PROCEDURE — 3074F SYST BP LT 130 MM HG: CPT | Performed by: NURSE PRACTITIONER

## 2023-08-08 PROCEDURE — 3008F BODY MASS INDEX DOCD: CPT | Performed by: NURSE PRACTITIONER

## 2023-08-08 NOTE — TELEPHONE ENCOUNTER
Medication(s) to Refill:   Requested Prescriptions     Pending Prescriptions Disp Refills    LOSARTAN 50 MG Oral Tab [Pharmacy Med Name: LOSARTAN POTASSIUM 50 MG Tablet] 90 tablet 1     Sig: TAKE 1 TABLET EVERY DAY         Reason for Medication Refill being sent to Provider / Reason Protocol Failed:  [] 90 day refill has already been granted  [] Blood Pressure out of range  [] Labs Abnormal/over due  [] Medication not previously prescribed by Provider  [] Non-Protocol Medication  [] Controlled Substance   [] Due for appointment- no future appointment scheduled  [] No Follow up specified      Last Time Medication was Filled:  5/24/2023      Last Office Visit with PCP: 8/4/2023    When Patient was Due Back to the Office:  not on file   (from when PCP last addressed condition)    Future Appointments:  Future Appointments   Date Time Provider Zeina Mcqueen   8/8/2023  3:30 PM Flavio Moritz, APN [facility] [place]         Last Blood Pressures:  BP Readings from Last 2 Encounters:  08/04/23 : 124/80  06/20/23 : 130/78        Action taken:  [] Refill approved per protocol  [x] Routing to provider for approval

## 2023-08-15 RX ORDER — LOSARTAN POTASSIUM 50 MG/1
50 TABLET ORAL DAILY
Qty: 90 TABLET | Refills: 1 | Status: SHIPPED | OUTPATIENT
Start: 2023-08-15

## 2023-08-22 ENCOUNTER — OFFICE VISIT (OUTPATIENT)
Dept: OBGYN CLINIC | Facility: CLINIC | Age: 68
End: 2023-08-22
Payer: MEDICARE

## 2023-08-22 VITALS
BODY MASS INDEX: 25.11 KG/M2 | SYSTOLIC BLOOD PRESSURE: 120 MMHG | RESPIRATION RATE: 17 BRPM | HEIGHT: 67 IN | HEART RATE: 80 BPM | WEIGHT: 160 LBS | DIASTOLIC BLOOD PRESSURE: 70 MMHG

## 2023-08-22 DIAGNOSIS — L81.9 SKIN HYPOPIGMENTATION: ICD-10-CM

## 2023-08-22 DIAGNOSIS — N90.89 VULVAR IRRITATION: Primary | ICD-10-CM

## 2023-08-22 PROCEDURE — 88305 TISSUE EXAM BY PATHOLOGIST: CPT | Performed by: NURSE PRACTITIONER

## 2023-08-22 PROCEDURE — 56605 BIOPSY OF VULVA/PERINEUM: CPT | Performed by: NURSE PRACTITIONER

## 2023-08-22 PROCEDURE — 3008F BODY MASS INDEX DOCD: CPT | Performed by: NURSE PRACTITIONER

## 2023-08-22 PROCEDURE — 3078F DIAST BP <80 MM HG: CPT | Performed by: NURSE PRACTITIONER

## 2023-08-22 PROCEDURE — 3074F SYST BP LT 130 MM HG: CPT | Performed by: NURSE PRACTITIONER

## 2023-08-22 PROCEDURE — 1159F MED LIST DOCD IN RCRD: CPT | Performed by: NURSE PRACTITIONER

## 2023-08-22 NOTE — PROGRESS NOTES
S: Procedure:  Vulvar  biopsy  She notes she used the Rx I gave her last time twice and she has had no further itching since. O:  All risks and benefits were discussed with the patient. She was positioned in stirups and the hypo pigmentation was minimally seen on left labia     The area was cleaned with betadine and anesthesized with 1% lidocaine. The 3mm punch biopsy was then cut and removed with sterile forceps. Any bleeding was controlled with sliver nitrate. Sterile dressing was applied. Patient tolerated the procedure well. A/P:  1. Vulvar irritation  Biopsy completed    2.  Skin hypopigmentation  Biopsy completed    Advise she call with any abnormal bleeding, redness, swelling, pain or abnormal drainage

## 2023-08-22 NOTE — PATIENT INSTRUCTIONS
VULVAR BIOPSY    Important points to know: You may resume normal physical activities. Expect some soreness when the anesthetic has worn off. Notify the office if you are uncomfortable to any significant degree. A small scab will develop over the site if the biopsy, but will disappear within 2-3 weeks as healing takes place. General Measures:   Wear cotton panties or pantyhose with a cotton crotch. Resume sexual intercourse once scabbing is healing   Shower is preferred versus tub bath until healing is complete. Call the office if:   You experience discomfort that simple pain medication does not relieve quickly. You have persistent bleeding from biopsy site. You have any pus like drainage noted from biopsy site. There is any unusual redness or inflammation of the biopsy site. You have chills or a temperature of 100.9 or greater. Note: The above information is meant to be used as a quide only.  If you have concerns or questions not answered on this sheet, please call the office at [phone]

## 2023-08-29 DIAGNOSIS — L28.0 LICHENOID DERMATITIS: Primary | ICD-10-CM

## 2023-08-29 RX ORDER — CLOBETASOL PROPIONATE 0.5 MG/G
OINTMENT TOPICAL
Qty: 30 G | Refills: 0 | Status: SHIPPED | OUTPATIENT
Start: 2023-08-29

## 2023-08-31 ENCOUNTER — TELEPHONE (OUTPATIENT)
Dept: OBGYN CLINIC | Facility: CLINIC | Age: 68
End: 2023-08-31

## 2023-09-05 ENCOUNTER — PATIENT MESSAGE (OUTPATIENT)
Dept: OBGYN CLINIC | Facility: CLINIC | Age: 68
End: 2023-09-05

## 2023-09-20 ENCOUNTER — TELEPHONE (OUTPATIENT)
Dept: FAMILY MEDICINE CLINIC | Facility: CLINIC | Age: 68
End: 2023-09-20

## 2023-09-20 DIAGNOSIS — L60.0 INGROWN TOENAIL: Primary | ICD-10-CM

## 2023-09-20 NOTE — TELEPHONE ENCOUNTER
Patient has [facility] and is requesting a referral to see Dr. Adalid Leary for an ingrown toenail.

## 2024-01-09 ENCOUNTER — TELEPHONE (OUTPATIENT)
Dept: FAMILY MEDICINE CLINIC | Facility: CLINIC | Age: 69
End: 2024-01-09

## 2024-01-17 ENCOUNTER — TELEPHONE (OUTPATIENT)
Dept: FAMILY MEDICINE CLINIC | Facility: CLINIC | Age: 69
End: 2024-01-17

## 2024-01-17 DIAGNOSIS — Z12.31 ENCOUNTER FOR SCREENING MAMMOGRAM FOR MALIGNANT NEOPLASM OF BREAST: Primary | ICD-10-CM

## 2024-01-17 DIAGNOSIS — E04.1 THYROID NODULE: ICD-10-CM

## 2024-01-17 DIAGNOSIS — I10 ESSENTIAL HYPERTENSION: Primary | ICD-10-CM

## 2024-01-17 DIAGNOSIS — R73.03 PREDIABETES: ICD-10-CM

## 2024-01-17 DIAGNOSIS — E78.00 PURE HYPERCHOLESTEROLEMIA: ICD-10-CM

## 2024-01-17 NOTE — TELEPHONE ENCOUNTER
Pt requesting orders for blood work for annual exam.   Subjective:      Patient ID: Hammad Douglas III is a 65 y.o. male.    Chief Complaint: Wound Care  Patient presents to clinic 14 weeks S/P bone spur resection from the plantar medial base of the proximal phalanx of the Lt. Hallux.  Patient relates a new opening to the plantar aspect of the surgical extremity.  States this has been present for the past week.  Denies this being a source of pain but was concerned by the presence of blood to the toe.  Denies trauma to the site.  Has been keeping the site painted with betadine twice daily as instructed.  Relates wearing supportive shoes in the meantime to offload the area.  Denies noticing purulent drainage.  Denies experiencing N/V/F/C/D. Denies any additional pedal complaints.    Past Medical History:   Diagnosis Date    Cataract     OS    Chronic kidney disease     Diabetes mellitus type I     Diagnosed at age 18    Diabetic retinopathy     See's Dr. Ledesma    Hyperlipidemia     Hypertension        Past Surgical History:   Procedure Laterality Date    CATARACT EXTRACTION W/  INTRAOCULAR LENS IMPLANT Right 09/28/2021    Dr Solis    COLONOSCOPY      2011 wnl    PHACOEMULSIFICATION OF CATARACT Right 9/28/2021    Procedure: PHACOEMULSIFICATION, CATARACT;  Surgeon: Vicente Solis Jr., MD;  Location: [facility] OR;  Service: Ophthalmology;  Laterality: Right;  DM/right    SURGICAL REMOVAL OF BONE SPUR Left 10/14/2021    Procedure: EXCISION, BONE SPUR;  Surgeon: Caleb Duffy DPM;  Location: [facility] OR;  Service: Podiatry;  Laterality: Left;    TOE AMPUTATION Right 7/2/2020    Procedure: AMPUTATION, TOE; 3rd;  Surgeon: Caleb Duffy DPM;  Location: [facility] OR;  Service: Podiatry;  Laterality: Right;    TOE SURGERY Right     right big toe       Family History   Problem Relation Age of Onset    Cataracts Mother     Cataracts Father     Breast cancer Sister     Glaucoma Neg Hx     Macular degeneration Neg Hx     Retinal detachment Neg Hx        Social History  "    Socioeconomic History    Marital status:    Tobacco Use    Smoking status: Former Smoker    Smokeless tobacco: Never Used    Tobacco comment: Former cigar use   Substance and Sexual Activity    Alcohol use: Yes     Alcohol/week: 2.0 standard drinks     Types: 2 Cans of beer per week     Comment: "2 beers a week"    Drug use: No    Sexual activity: Yes     Partners: Female   Social History Narrative    Retired former radiation  at [employer]       Current Outpatient Medications   Medication Sig Dispense Refill    amoxicillin-clavulanate 875-125mg (AUGMENTIN) 875-125 mg per tablet Take 1 tablet by mouth every 12 (twelve) hours. 14 tablet 0    atorvastatin (LIPITOR) 40 MG tablet TAKE 1 TABLET(40 MG) BY MOUTH EVERY DAY 90 tablet 3    BD BETSY 2ND GEN PEN NEEDLE 32 gauge x 5/32" Ndle       diclofenac (VOLTAREN) 0.1 % ophthalmic solution Place 1 drop into the left eye 4 (four) times daily. 5 mL 3    dorzolamide-timolol 2-0.5% (COSOPT) 22.3-6.8 mg/mL ophthalmic solution Place 1 drop into both eyes 2 (two) times daily. 10 mL 3    FLUoxetine 20 MG capsule TAKE ONE CAPSULE BY MOUTH EVERY DAY 90 capsule 3    insulin aspart U-100 (NOVOLOG) 100 unit/mL (3 mL) InPn pen INJECT USING INSULIN:CARB RATIO OF 1:5 BEFORE A MEAL. MAX TOTAL DAILY DOSE 40 UNITS 45 mL 3    insulin glargine U-300 conc (TOUJEO MAX U-300 SOLOSTAR) 300 unit/mL (3 mL) insulin pen ADMINISTER 26 UNITS UNDER THE SKIN EVERY EVENING 4 pen 3    losartan (COZAAR) 100 MG tablet TAKE 1 TABLET(100 MG) BY MOUTH EVERY DAY 90 tablet 3    pen needle, diabetic (BD ULTRA-FINE MINI PEN NEEDLE) 31 gauge x 3/16" Ndle USE 1 FOUR TIMES DAILY 150 each 11    prednisoLONE acetate (PRED FORTE) 1 % DrpS Place 1 drop into the left eye 4 (four) times daily. 5 mL 3    sulfamethoxazole-trimethoprim 800-160mg (BACTRIM DS) 800-160 mg Tab TAKE 1 TABLET BY MOUTH TWICE DAILY 14 tablet 0    GLUCAGEN HYPOKIT 1 mg SolR Inject 1 mg into the muscle as " needed (for severe hypoglycemia). 1 each 0     Current Facility-Administered Medications   Medication Dose Route Frequency Provider Last Rate Last Admin    glucagon (human recombinant) injection 1 mg  1 mg Intramuscular Once Tania Mckeon NP         Facility-Administered Medications Ordered in Other Visits   Medication Dose Route Frequency Provider Last Rate Last Admin    electrolyte-S (ISOLYTE)   Intravenous Continuous Jostin Resendiz MD        lactated ringers infusion   Intravenous Continuous Jostin Resendiz MD 10 mL/hr at 07/02/20 1232 New Bag at 10/14/21 1355    lidocaine (PF) 10 mg/ml (1%) injection 10 mg  1 mL Intradermal Once Jostin Resendiz MD           Review of patient's allergies indicates:   Allergen Reactions    Altace [ramipril]      Cough         Review of Systems   Constitution: Negative for chills and fever.   Cardiovascular: Negative for claudication and leg swelling.   Skin: Negative for color change, dry skin and poor wound healing.   Musculoskeletal: Negative for joint pain, joint swelling, muscle cramps and muscle weakness.   Neurological: Positive for numbness.   Psychiatric/Behavioral: Negative for altered mental status.           Objective:      Physical Exam   Constitutional: He appears well-developed and well-nourished. No distress.   Cardiovascular:   Pulses:       Dorsalis pedis pulses are 2+ on the right side, and 2+ on the left side.        Posterior tibial pulses are 1+ on the right side, and 1+ on the left side.   CFT is < 3 seconds bilateral.  Pedal hair growth is present bilateral.  Varicosities noted bilateral.  No lower extremity edema noted.  Toes are warm to touch bilateral.     Musculoskeletal: He exhibits no edema or tenderness.   Muscle strength 5/5 in all muscle groups bilateral.  No tenderness nor crepitation with ROM of foot/ankle joints bilateral.  No tenderness with palpation of bilateral foot and ankle.  Semi-reducible contracture of the lesser digits  bilateral.   Amputation of Rt. 3rd toe.  Neurological: He has normal strength. A sensory deficit is present.   Protective sensation per [place] monofilament is decreased bilateral.  Light touch is intact bilateral.   Skin: Skin is warm and dry. Capillary refill takes less than 2 seconds. Lesion noted. No abrasion, no burn, no ecchymosis, no laceration and no petechiae noted. He is not diaphoretic.   Location: Open wound noted to the plantar aspect of the Lt. hallux  Base: Down to dermis and comprised of fibrin.    Drainage: None  Skylar wound: Devoid of erythema, edema, fluctuance, purulence, and malodor.   Pre-debridement measurement: 0.2 x 0.2 x 0.1cm  Post-debridement measurement: 0.4 x 0.4 x 0.1cm            Assessment:       Encounter Diagnoses   Name Primary?    Diabetic ulcer of toe of left foot associated with type 1 diabetes mellitus, limited to breakdown of skin Yes    Type 1 diabetes mellitus with polyneuropathy          Plan:       Hammad was seen today for wound care.    Diagnoses and all orders for this visit:    Diabetic ulcer of toe of left foot associated with type 1 diabetes mellitus, limited to breakdown of skin  -     Aerobic culture  -     Culture, Anaerobic    Type 1 diabetes mellitus with polyneuropathy      I counseled the patient on his conditions, their implications and medical management.    Based on today's exam, the area of skin breakdown is several millimeters lateral to the recent surgical incision.  It seems he re-ulcerated at the prior site of skin breakdown which is typical with diabetic wounds.  Explained to the patient that is can take up to two years for the skin in a previously traumatized area to fully mature.    Discussed with patient the associated risks and benefits associated with a selective excisional debridement of the Lt. foot.  Verbal consent was obtained. See attached procedure note.      Wound cultures were obtained.    The wound base was covered with sawyer  and a band aid.     Fitted and dispensed a toe sleeve to worn at all times while ambulatory.      To continue use of [place] shoes, as this will limit pressure to the forefoot.    Instructed to change his bandage every two days.    Instructed to call if the site begins to appear infected.    RTC in 2 weeks for follow up.    Caleb Duffy DPM

## 2024-01-17 NOTE — TELEPHONE ENCOUNTER
Pt is asking for an order for mammogram.Pt states she got a letter in the mail to schedule but does not have an order.

## 2024-01-23 ENCOUNTER — HOSPITAL ENCOUNTER (OUTPATIENT)
Dept: GENERAL RADIOLOGY | Age: 69
Discharge: HOME | End: 2024-01-23
Attending: EMERGENCY MEDICINE
Payer: COMMERCIAL

## 2024-01-23 DIAGNOSIS — R23.2 HOT FLASHES: ICD-10-CM

## 2024-01-23 PROCEDURE — 71046 X-RAY EXAM CHEST 2 VIEWS: CPT | Performed by: EMERGENCY MEDICINE

## 2024-01-25 ENCOUNTER — HOSPITAL ENCOUNTER (OUTPATIENT)
Dept: ULTRASOUND IMAGING | Age: 69
Discharge: HOME | End: 2024-01-25
Attending: OTOLARYNGOLOGY
Payer: COMMERCIAL

## 2024-01-25 DIAGNOSIS — E04.2 MULTIPLE THYROID NODULES: ICD-10-CM

## 2024-01-25 PROCEDURE — 76536 US EXAM OF HEAD AND NECK: CPT | Performed by: OTOLARYNGOLOGY

## 2024-01-30 ENCOUNTER — HOSPITAL ENCOUNTER (OUTPATIENT)
Dept: MAMMOGRAPHY | Age: 69
Discharge: HOME | End: 2024-01-30
Attending: EMERGENCY MEDICINE
Payer: MEDICARE

## 2024-01-30 DIAGNOSIS — Z12.31 ENCOUNTER FOR SCREENING MAMMOGRAM FOR MALIGNANT NEOPLASM OF BREAST: ICD-10-CM

## 2024-01-30 PROCEDURE — 77063 BREAST TOMOSYNTHESIS BI: CPT | Performed by: EMERGENCY MEDICINE

## 2024-01-30 PROCEDURE — 77067 SCR MAMMO BI INCL CAD: CPT | Performed by: EMERGENCY MEDICINE

## 2024-02-01 ENCOUNTER — LAB ENCOUNTER (OUTPATIENT)
Dept: LAB | Age: 69
End: 2024-02-01
Attending: EMERGENCY MEDICINE
Payer: MEDICARE

## 2024-02-01 DIAGNOSIS — E78.00 PURE HYPERCHOLESTEROLEMIA: ICD-10-CM

## 2024-02-01 DIAGNOSIS — R73.03 PREDIABETES: ICD-10-CM

## 2024-02-01 DIAGNOSIS — I10 ESSENTIAL HYPERTENSION: ICD-10-CM

## 2024-02-01 LAB
ALBUMIN SERPL-MCNC: 3.5 G/DL (ref 3.4–5)
ALBUMIN/GLOB SERPL: 1 {RATIO} (ref 1–2)
ALP LIVER SERPL-CCNC: 89 U/L
ALT SERPL-CCNC: 18 U/L
ANION GAP SERPL CALC-SCNC: 2 MMOL/L (ref 0–18)
AST SERPL-CCNC: 22 U/L (ref 15–37)
BASOPHILS # BLD AUTO: 0.1 X10(3) UL (ref 0–0.2)
BASOPHILS NFR BLD AUTO: 1.7 %
BILIRUB SERPL-MCNC: 0.6 MG/DL (ref 0.1–2)
BUN BLD-MCNC: 23 MG/DL (ref 9–23)
CALCIUM BLD-MCNC: 9.1 MG/DL (ref 8.5–10.1)
CHLORIDE SERPL-SCNC: 108 MMOL/L (ref 98–112)
CHOLEST SERPL-MCNC: 212 MG/DL (ref ?–200)
CO2 SERPL-SCNC: 32 MMOL/L (ref 21–32)
CREAT BLD-MCNC: 0.75 MG/DL
EGFRCR SERPLBLD CKD-EPI 2021: 87 ML/MIN/1.73M2 (ref 60–?)
EOSINOPHIL # BLD AUTO: 0.22 X10(3) UL (ref 0–0.7)
EOSINOPHIL NFR BLD AUTO: 3.7 %
ERYTHROCYTE [DISTWIDTH] IN BLOOD BY AUTOMATED COUNT: 12.7 %
EST. AVERAGE GLUCOSE BLD GHB EST-MCNC: 120 MG/DL (ref 68–126)
FASTING PATIENT LIPID ANSWER: YES
FASTING STATUS PATIENT QL REPORTED: YES
GLOBULIN PLAS-MCNC: 3.5 G/DL (ref 2.8–4.4)
GLUCOSE BLD-MCNC: 94 MG/DL (ref 70–99)
HBA1C MFR BLD: 5.8 % (ref ?–5.7)
HCT VFR BLD AUTO: 43.2 %
HDLC SERPL-MCNC: 66 MG/DL (ref 40–59)
HGB BLD-MCNC: 13.9 G/DL
IMM GRANULOCYTES # BLD AUTO: 0.01 X10(3) UL (ref 0–1)
IMM GRANULOCYTES NFR BLD: 0.2 %
LDLC SERPL CALC-MCNC: 130 MG/DL (ref ?–100)
LYMPHOCYTES # BLD AUTO: 2.68 X10(3) UL (ref 1–4)
LYMPHOCYTES NFR BLD AUTO: 44.7 %
MCH RBC QN AUTO: 30.3 PG (ref 26–34)
MCHC RBC AUTO-ENTMCNC: 32.2 G/DL (ref 31–37)
MCV RBC AUTO: 94.1 FL
MONOCYTES # BLD AUTO: 0.55 X10(3) UL (ref 0.1–1)
MONOCYTES NFR BLD AUTO: 9.2 %
NEUTROPHILS # BLD AUTO: 2.43 X10 (3) UL (ref 1.5–7.7)
NEUTROPHILS # BLD AUTO: 2.43 X10(3) UL (ref 1.5–7.7)
NEUTROPHILS NFR BLD AUTO: 40.5 %
NONHDLC SERPL-MCNC: 146 MG/DL (ref ?–130)
OSMOLALITY SERPL CALC.SUM OF ELEC: 297 MOSM/KG (ref 275–295)
PLATELET # BLD AUTO: 314 10(3)UL (ref 150–450)
POTASSIUM SERPL-SCNC: 4 MMOL/L (ref 3.5–5.1)
PROT SERPL-MCNC: 7 G/DL (ref 6.4–8.2)
RBC # BLD AUTO: 4.59 X10(6)UL
SODIUM SERPL-SCNC: 142 MMOL/L (ref 136–145)
TRIGL SERPL-MCNC: 93 MG/DL (ref 30–149)
TSI SER-ACNC: 1.37 MIU/ML (ref 0.36–3.74)
VLDLC SERPL CALC-MCNC: 17 MG/DL (ref 0–30)
WBC # BLD AUTO: 6 X10(3) UL (ref 4–11)

## 2024-02-01 PROCEDURE — 80061 LIPID PANEL: CPT

## 2024-02-01 PROCEDURE — 80053 COMPREHEN METABOLIC PANEL: CPT

## 2024-02-01 PROCEDURE — 83036 HEMOGLOBIN GLYCOSYLATED A1C: CPT

## 2024-02-01 PROCEDURE — 36415 COLL VENOUS BLD VENIPUNCTURE: CPT

## 2024-02-01 PROCEDURE — 84443 ASSAY THYROID STIM HORMONE: CPT

## 2024-02-01 PROCEDURE — 85025 COMPLETE CBC W/AUTO DIFF WBC: CPT

## 2024-02-02 ENCOUNTER — APPOINTMENT (OUTPATIENT)
Dept: URBAN - METROPOLITAN AREA CLINIC 247 | Age: 69
Setting detail: DERMATOLOGY
End: 2024-02-02

## 2024-02-02 DIAGNOSIS — L57.0 ACTINIC KERATOSIS: ICD-10-CM

## 2024-02-02 DIAGNOSIS — L82.1 OTHER SEBORRHEIC KERATOSIS: ICD-10-CM

## 2024-02-02 DIAGNOSIS — L57.8 OTHER SKIN CHANGES DUE TO CHRONIC EXPOSURE TO NONIONIZING RADIATION: ICD-10-CM

## 2024-02-02 DIAGNOSIS — D22 MELANOCYTIC NEVI: ICD-10-CM

## 2024-02-02 DIAGNOSIS — D18.0 HEMANGIOMA: ICD-10-CM

## 2024-02-02 DIAGNOSIS — L91.8 OTHER HYPERTROPHIC DISORDERS OF THE SKIN: ICD-10-CM

## 2024-02-02 PROBLEM — D22.39 MELANOCYTIC NEVI OF OTHER PARTS OF FACE: Status: ACTIVE | Noted: 2024-02-02

## 2024-02-02 PROBLEM — D18.01 HEMANGIOMA OF SKIN AND SUBCUTANEOUS TISSUE: Status: ACTIVE | Noted: 2024-02-02

## 2024-02-02 PROCEDURE — 17000 DESTRUCT PREMALG LESION: CPT

## 2024-02-02 PROCEDURE — OTHER COUNSELING: OTHER

## 2024-02-02 PROCEDURE — 99213 OFFICE O/P EST LOW 20 MIN: CPT | Mod: 25

## 2024-02-02 PROCEDURE — OTHER LIQUID NITROGEN: OTHER

## 2024-02-02 PROCEDURE — OTHER MIPS QUALITY: OTHER

## 2024-02-02 ASSESSMENT — LOCATION ZONE DERM
LOCATION ZONE: ARM
LOCATION ZONE: FACE
LOCATION ZONE: TRUNK
LOCATION ZONE: NECK

## 2024-02-02 ASSESSMENT — LOCATION DETAILED DESCRIPTION DERM
LOCATION DETAILED: RIGHT CENTRAL MALAR CHEEK
LOCATION DETAILED: LEFT PROXIMAL LATERAL POSTERIOR UPPER ARM
LOCATION DETAILED: LEFT MEDIAL UPPER BACK
LOCATION DETAILED: LEFT INFERIOR MEDIAL MALAR CHEEK
LOCATION DETAILED: LEFT SUPERIOR MEDIAL UPPER BACK
LOCATION DETAILED: UPPER STERNUM
LOCATION DETAILED: RIGHT MEDIAL SUPERIOR CHEST
LOCATION DETAILED: LEFT INFERIOR ANTERIOR NECK

## 2024-02-02 ASSESSMENT — LOCATION SIMPLE DESCRIPTION DERM
LOCATION SIMPLE: LEFT UPPER BACK
LOCATION SIMPLE: LEFT ANTERIOR NECK
LOCATION SIMPLE: CHEST
LOCATION SIMPLE: LEFT CHEEK
LOCATION SIMPLE: LEFT POSTERIOR UPPER ARM
LOCATION SIMPLE: RIGHT CHEEK

## 2024-02-02 NOTE — PROCEDURE: LIQUID NITROGEN
Render Note In Bullet Format When Appropriate: No
Post-Care Instructions: I reviewed with the patient in detail post-care instructions. Patient is to wear sunprotection, and avoid picking at any of the treated lesions. Pt may apply Vaseline to crusted or scabbing areas.
Number Of Freeze-Thaw Cycles: 2 freeze-thaw cycles
Detail Level: Detailed
Consent: The patient's consent was obtained including but not limited to risks of crusting, scabbing, blistering, scarring, darker or lighter pigmentary change, recurrence, incomplete removal and infection.
Show Aperture Variable?: Yes
Duration Of Freeze Thaw-Cycle (Seconds): 5

## 2024-02-06 ENCOUNTER — TELEPHONE (OUTPATIENT)
Dept: FAMILY MEDICINE CLINIC | Facility: CLINIC | Age: 69
End: 2024-02-06

## 2024-02-06 RX ORDER — GARLIC EXTRACT 500 MG
1 CAPSULE ORAL DAILY
COMMUNITY

## 2024-02-06 RX ORDER — FLUTICASONE PROPIONATE 50 MCG
2 SPRAY, SUSPENSION (ML) NASAL
COMMUNITY

## 2024-02-06 RX ORDER — CALCIUM POLYCARBOPHIL 625 MG
1 TABLET ORAL DAILY
COMMUNITY

## 2024-02-06 RX ORDER — MULTIVITAMIN WITH IRON
250 TABLET ORAL DAILY
COMMUNITY

## 2024-02-06 NOTE — TELEPHONE ENCOUNTER
COMPREHENSIVE MEDICATION REVIEW         Kathleen Easton MRN [id number]    1955 PCP Yenny Mariee MD     Comments: Medication history completed by Ambulatory Clinic Pharmacist over the phone on 24. Patient has upcoming AWV with PCP on 24.     After thorough medication review, 7 discrepancies have been identified and corrected on patient's medication list. See updated list below:     Outpatient Encounter Medications as of 2024   Medication Sig    melatonin 5 MG Oral Cap Take 1 capsule (5 mg total) by mouth nightly as needed.    magnesium 250 MG Oral Tab Take 1 tablet (250 mg total) by mouth daily.    Multiple Vitamins-Minerals (HEALTHY EYES OR) Take 1 tablet by mouth daily.    Calcium Polycarbophil (FIBER) 625 MG Oral Tab Take 1 tablet (625 mg total) by mouth daily.    acidophilus-pectin Oral Cap Take 1 capsule by mouth daily.    fluticasone propionate 50 MCG/ACT Nasal Suspension 2 sprays by Each Nare route daily as needed for Rhinitis.    LOSARTAN 50 MG Oral Tab TAKE 1 TABLET EVERY DAY    carbidopa-levodopa  MG Oral Tab Take 1 tablet by mouth 3 (three) times daily.    gabapentin 300 MG Oral Cap Take 1-3 capsules (300-900 mg total) by mouth daily. 3-4 times daily as directed.    clobetasol 0.05 % External Ointment Apply sparingly at HS twice a week for 6 weeks then once a week at HS    cyclobenzaprine 10 MG Oral Tab Take 1 tablet (10 mg total) by mouth 3 (three) times daily as needed for Muscle spasms.     Medication Assessment:   Reviewed all medications in detail with patient including dose, indication, timing of administration, monitoring parameters, and potential side effects of medications.     Patient reports taking losartan 50 mg daily as prescribed. She does not usually monitor her blood pressure at home and has been pretty stable on this regimen. Did recommend she monitor her blood pressure 2-3 times weekly and bring readings in to all MD appointments for review.     Patient is  taking gabapentin as needed for this chronic cough she has. She reports taking 1 tablet twice daily every day and will sometimes take additional tablets as needed for days when her cough is worse. She tells me this has been helping with her cough.     Did provide education and stressed the importance of taking medication just like prescribed to get the most benefit. Patient denies forgetting or missing medication doses. Patient denies any questions or concerns with medications at this time.     Thank you,    Alicia Stockton, PharmD, 2/6/2024, 2:58 PM

## 2024-02-08 ENCOUNTER — OFFICE VISIT (OUTPATIENT)
Dept: FAMILY MEDICINE CLINIC | Facility: CLINIC | Age: 69
End: 2024-02-08
Payer: MEDICARE

## 2024-02-08 VITALS
BODY MASS INDEX: 24.64 KG/M2 | RESPIRATION RATE: 16 BRPM | HEART RATE: 93 BPM | DIASTOLIC BLOOD PRESSURE: 84 MMHG | HEIGHT: 67 IN | SYSTOLIC BLOOD PRESSURE: 120 MMHG | WEIGHT: 157 LBS | OXYGEN SATURATION: 99 %

## 2024-02-08 DIAGNOSIS — I10 ESSENTIAL HYPERTENSION: ICD-10-CM

## 2024-02-08 DIAGNOSIS — G20.A1 PARKINSON'S DISEASE: ICD-10-CM

## 2024-02-08 DIAGNOSIS — R05.3 CHRONIC COUGH: ICD-10-CM

## 2024-02-08 DIAGNOSIS — Z00.00 ENCOUNTER FOR ANNUAL HEALTH EXAMINATION: Primary | ICD-10-CM

## 2024-02-08 DIAGNOSIS — E78.00 HYPERCHOLESTEROLEMIA: ICD-10-CM

## 2024-02-08 RX ORDER — LOSARTAN POTASSIUM 50 MG/1
50 TABLET ORAL DAILY
Qty: 90 TABLET | Refills: 1 | Status: SHIPPED | OUTPATIENT
Start: 2024-02-08

## 2024-02-08 NOTE — PROGRESS NOTES
Subjective:   Kathleen Easton is a 68 year old female who presents for a MA (Medicare Advantage) Supervisit (Once per calendar year) and scheduled follow up of multiple significant but stable problems        HYPERTENSION   ON losartan 50 mg  no CP no SOB, has chronic cough but this is not new  Compliant with all medications.  No home BP checks    PARKINSONS DISEASE  Missed last appt with neurology  Has upcoming appointment with Neurology in July  On Sinemet 3 x a day  No recent falls          CHRONIC COUGH  Known with chronic cough in 1970's  Worse with pregnancy  Worked up in the past with pulmonology no meds, PFTs nl  Non smoker  Follows up with EENT for thyroid nodules and vocal cord laryngeospasm  currently on gabapentin         The 10-year ASCVD risk score (Luke DUFFY, et al., 2019) is: 7.9%    Values used to calculate the score:      Age: 68 years      Sex: Female      Is Non- : No      Diabetic: No      Tobacco smoker: No      Systolic Blood Pressure: 120 mmHg      Is BP treated: Yes      HDL Cholesterol: 66 mg/dL      Total Cholesterol: 212 mg/dL                History/Other:   Fall Risk Assessment:   She has been screened for Falls and is High Risk. Fall Prevention information provided to patient in After Visit Summary.            Cognitive Assessment:   She had a completely normal cognitive assessment - see flowsheet entries     Functional Ability/Status:   Kathleen Easton has some abnormal functions as listed below:  She has difficulties Managing Money/Bills based on screening of functional status.      Depression Screening (PHQ-2/PHQ-9): PHQ-2 SCORE: 0  , done 2/1/2024         Advanced Directives:   She does have a Living Will but we do NOT have it on file in Epic.    She does have a POA but we do NOT have it on file in Epic.      Discussed with patient            Patient Active Problem List   Diagnosis    Cough: multifactorial (PLEASE SEE OVERVIEW FOR DETAILS) - Dr. Henao    Essential  hypertension    Thyroid nodules [3 Rt; 3 Lt] [5 < 1.0 cm & 1 = 1.1 cm] / US (12/11) --> (12/16)  / FNA (2/12) - Janis &/or LATOSHA Gerard [PLEASE SEE OVERVIEW FOR DETAILS]    LPRD (laryngopharyngeal reflux disease) Rx:     Hypercholesterolemia [Pure] / Rx none LDL @ goal < 160 with diet.    Diastolic dysfunction / Dx by ECHO Cardiogram [11/10]    Elevated fasting glucose / HbA1C ~ 5.9%    S/P laparoscopic cholecystectomy [1/16] - REY Trejo    VMR (vasomotor rhinitis)    Primary osteoarthritis of both first carpometacarpal joints    Trigger finger of left thumb    Abnormal electrocardiogram T inversion III & aVF (ECG) (EKG) --> Stress ECHO [5/17] normal    S/P [5/17] hysterectomy [ovaries present] for uterine prolapse - JESSICA Jaramillo    Shoulder impingement, left    Adhesive capsulitis of right shoulder    Adhesive capsulitis of left shoulder    Rotator cuff tendinitis, left    Mass of finger, left    Ganglion cyst of flexor tendon sheath of finger, left    Thyroid nodule    Chronic left-sided low back pain with left-sided sciatica    Mallet deformity of right ring finger    Parkinson's disease    Prediabetes     Allergies:  She is allergic to flagyl [metronidazole], tylenol w/codeine, other, pain relief, and vicodin [hydrocodone-acetaminophen].    Current Medications:  Outpatient Medications Marked as Taking for the 2/8/24 encounter (Office Visit) with Yenny Mariee MD   Medication Sig    carbidopa-levodopa  MG Oral Tab Take 1 tablet by mouth 3 (three) times daily.    losartan 50 MG Oral Tab Take 1 tablet (50 mg total) by mouth daily.       Medical History:  She  has a past medical history of ALLERGIC RHINITIS, Allergic rhinitis, Anesthesia complication, Anxiety, Arthritis, Back problem, Cough (07/22/2010), Diverticulitis, Esophageal reflux, Essential hypertension, GERD (6/3/11:  Esophagitis), H/O mammogram (11/16,09/15,09/14), History of endometrial biopsy (11/1502/15,02/14), History of general anesthesia  complication, Hyperlipidemia, Osteoarthrosis, unspecified whether generalized or localized, unspecified site, Pap smear for cervical cancer screening (,10/15,), Parkinson's disease (10/2019), and Unspecified disorder of thyroid.  Surgical History:  She  has a past surgical history that includes colonoscopy (6/3/11= Normal); other; dilation/curettage,diagnostic; Laparoscopic cholecystectomy (N/A, 2016); other surgical history; hysterectomy (); colonoscopy (18= Diverticulosis); Vaginal hysterectomy (N/A, 2017); colonoscopy (N/A, 2018); Colon surgery (01/15/2018); anesth,surgery of shoulder (Left); cyst removal (Left); cholecystectomy (); d & c; and .   Family History:  Her family history includes Cancer in her brother; Diabetes in her sister; Heart Disorder in her mother; Hypertension in her father; Other in her father and mother.  Social History:  She  reports that she quit smoking about 45 years ago. Her smoking use included cigarettes. She has a 3 pack-year smoking history. She has quit using smokeless tobacco. She reports current alcohol use of about 1.0 standard drink of alcohol per week. She reports that she does not use drugs.    Tobacco:  She smoked tobacco in the past but quit greater than 12 months ago.  Social History    Tobacco Use      Smoking status: Former        Packs/day: 1.00        Years: 3.00        Additional pack years: 0.00        Total pack years: 3.00        Types: Cigarettes        Quit date: 1978        Years since quittin.2      Smokeless tobacco: Former         CAGE Alcohol Screen:   CAGE screening score of 0 on 2024, showing low risk of alcohol abuse.      Patient Care Team:  Yenny Mariee MD as PCP - General (Family Medicine)  Jabier Henao (OTOLARYNGOLOGY)  Remberto Gerard MD (Otolaryngologist (ENT))  Maxime Garcia MD as Consulting Physician (GASTROENTEROLOGY)  Luiz Evans MD as Consulting Physician  (GASTROENTEROLOGY)  Avani Oreilly APN as Obstetrician (Nurse Practitioner Women's Health)    Review of Systems  GENERAL: feels well otherwise  SKIN: denies any unusual skin lesions  EYES: denies blurred vision or double vision  HEENT: denies nasal congestion, sinus pain or ST  LUNGS: denies shortness of breath with exertion  CARDIOVASCULAR: denies chest pain on exertion  GI: denies abdominal pain, denies heartburn  : denies dysuria, vaginal discharge or itching, no complaint of urinary incontinence   MUSCULOSKELETAL: denies back pain  NEURO: denies headaches  PSYCHE: denies depression or anxiety  HEMATOLOGIC: denies hx of anemia  ENDOCRINE: denies thyroid history  ALL/ASTHMA: denies hx of allergy or asthma    Objective:   Physical Exam  General Appearance:  Alert, cooperative, no distress, appears stated age   Head:  Normocephalic, without obvious abnormality, atraumatic   Eyes:  PERRL, conjunctiva/corneas clear, EOM's intact both eyes   Ears:  Normal TM's and external ear canals, both ears   Nose: Nares normal, septum midline,mucosa normal, no drainage or sinus tenderness   Throat: Lips, mucosa, and tongue normal; teeth and gums normal   Neck: Supple, symmetrical, trachea midline, no adenopathy;  thyroid: not enlarged, symmetric, no tenderness/mass/nodules; no carotid bruit or JVD   Back:   Symmetric, no curvature, ROM normal, no CVA tenderness   Lungs:   Clear to auscultation bilaterally, respirations unlabored   Heart:  Regular rate and rhythm, S1 and S2 normal, no murmur, rub, or gallop   Abdomen:   Soft, non-tender, bowel sounds active all four quadrants,  no masses, no organomegaly   Pelvic: Deferred   Extremities: Extremities normal, atraumatic, no cyanosis or edema   Pulses: 2+ and symmetric   Skin: Skin color, texture, turgor normal, no rashes or lesions   Lymph nodes: Cervical, supraclavicular, and axillary nodes normal   Neurologic: Normal flat affect       /84   Pulse 93   Resp 16   Ht 5'  7\" (1.702 m)   Wt 157 lb (71.2 kg)   SpO2 99%   BMI 24.59 kg/m²  Estimated body mass index is 24.59 kg/m² as calculated from the following:    Height as of this encounter: 5' 7\" (1.702 m).    Weight as of this encounter: 157 lb (71.2 kg).    Medicare Hearing Assessment:   Hearing Screening    Screening Method: Questionnaire  I have a problem hearing over the telephone: No I have trouble following the conversations when two or more people are talking at the same time: No   I have trouble understanding things on the TV: No I have to strain to understand conversations: No   I have to worry about missing the telephone ring or doorbell: No I have trouble hearing conversations in a noisy background such as a crowded room or restaurant: No   I get confused about where sounds come from: No I misunderstand some words in a sentence and need to ask people to repeat themselves: No   I especially have trouble understanding the speech of women and children: No I have trouble understanding the speaker in a large room such as at a meeting or place of Faith: No   Many people I talk to seem to mumble (or don't speak clearly): No People get annoyed because I misunderstand what they say: No   I misunderstand what others are saying and make inappropriate responses: No I avoid social activities because I cannot hear well and fear I will reply improperly: No   Family members and friends have told me they think I may have hearing loss: No             Visual Acuity:   Right Eye Visual Acuity: Uncorrected Right Eye Chart Acuity: 20/20   Left Eye Visual Acuity: Uncorrected Left Eye Chart Acuity: 20/25   Both Eyes Visual Acuity: Uncorrected Both Eyes Chart Acuity: 20/25   Able To Tolerate Visual Acuity: Yes        Assessment & Plan:   Kathleen Easton is a 68 year old female who presents for a Medicare Assessment.     1. Encounter for annual health examination (Primary)  2. Chronic cough  Overview:  Perennial rhinitis  GERD/LPRD  PFT's normal  [12/10] - FEV - 1: 2.48 L/sec; FEV - 1 / FVC = 97% predicted   Neurogenic  Cough has been worked up extensively in the past.  Had been following up with [facility] in the past and is currently on gabapentin for what is assumed as laryngeal spasm.  Has been recently evaluated by pulmonology with PFTs were normal.  Encourage patient to follow-up with [facility] possible therapy for voice therapy.        3. Essential hypertension  -     Losartan Potassium; Take 1 tablet (50 mg total) by mouth daily.  Dispense: 90 tablet; Refill: 1    Stable okay to continue with losartan.    4. Parkinson's disease  -     Carbidopa-Levodopa; Take 1 tablet by mouth 3 (three) times daily.  Dispense: 270 tablet; Refill: 1  The patient indicates understanding of these issues and agrees to the plan.  Currently without a neurologist.  Has upcoming appointment in July 2024.  No new symptoms Sinemet refilled.      5. Hypercholesterolemia  - Comp Metabolic Panel (14); Future  - Lipid Panel; Future  Labs discussed  Ok to recheck in 6 months  No fam history of early CAD  Counseled on low-fat low-cholesterol diet and TLC's.  ASCVD risk less than 10%      Reinforced healthy diet, lifestyle, and exercise.      No follow-ups on file.     Yenny Mariee MD, 2/8/2024     Supplementary Documentation:   General Health:            Kathleen Easton's SCREENING SCHEDULE   Tests on this list are recommended by your physician but may not be covered, or covered at this frequency, by your insurer.   Please check with your insurance carrier before scheduling to verify coverage.   PREVENTATIVE SERVICES FREQUENCY &  COVERAGE DETAILS LAST COMPLETION DATE   Diabetes Screening    Fasting Blood Sugar /  Glucose    One screening every 12 months if never tested or if previously tested but not diagnosed with pre-diabetes   One screening every 6 months if diagnosed with pre-diabetes Lab Results   Component Value Date    GLUCOSE 96 11/20/2014    GLU 94  02/01/2024        Cardiovascular Disease Screening    Lipid Panel  Cholesterol  Lipoprotein (HDL)  Triglycerides Covered every 5 years for all Medicare beneficiaries without apparent signs or symptoms of cardiovascular disease Lab Results   Component Value Date    CHOLEST 212 (H) 02/01/2024    HDL 66 (H) 02/01/2024     (H) 02/01/2024    TRIG 93 02/01/2024         Electrocardiogram (EKG)   Covered if needed at Welcome to Medicare, and non-screening if indicated for medical reasons 06/28/2019      Ultrasound Screening for Abdominal Aortic Aneurysm (AAA) Covered once in a lifetime for one of the following risk factors    Men who are 65-75 years old and have ever smoked    Anyone with a family history -     Colorectal Cancer Screening  Covered for ages 50-85; only need ONE of the following:    Colonoscopy   Covered every 10 years    Covered every 2 years if patient is at high risk or previous colonoscopy was abnormal 01/02/2018    Health Maintenance   Topic Date Due    Colorectal Cancer Screening  01/02/2028       Flexible Sigmoidoscopy   Covered every 4 years -    Fecal Occult Blood Test Covered annually -   Bone Density Screening    Bone density screening    Covered every 2 years after age 65 if diagnosed with risk of osteoporosis or estrogen deficiency.    Covered yearly for long-term glucocorticoid medication use (Steroids) Last Dexa Scan:    DEXA BONE DENSITY AXIAL (CPT=77080) 02/18/2021      No recommendations at this time   Pap and Pelvic    Pap   Covered every 2 years for women at normal risk; Annually if at high risk -  No recommendations at this time    Chlamydia Annually if high risk -  No recommendations at this time   Screening Mammogram    Mammogram     Recommend annually for all female patients aged 40 and older    One baseline mammogram covered for patients aged 35-39 01/30/2024    Health Maintenance   Topic Date Due    Mammogram  01/30/2025       Immunizations    Influenza Covered once per flu  season  Please get every year 09/29/2023  No recommendations at this time    Pneumococcal Each vaccine ([id number] & Edutmimbx72) covered once after 65 Prevnar 13: -    Cdffloddi38: 11/01/2021     No recommendations at this time    Hepatitis B One screening covered for patients with certain risk factors   -  No recommendations at this time    Tetanus Toxoid Not covered by Medicare Part B unless medically necessary (cut with metal); may be covered with your pharmacy prescription benefits -    Tetanus, Diptheria and Pertusis TD and TDaP Not covered by Medicare Part B -  No recommendations at this time    Zoster Not covered by Medicare Part B; may be covered with your pharmacy  prescription benefits -  Zoster Vaccines(1 of 2) Never done     Annual Monitoring of Persistent Medications (ACE/ARB, digoxin diuretics, anticonvulsants)    Potassium Annually Lab Results   Component Value Date    K 4.0 02/01/2024         Creatinine   Annually Lab Results   Component Value Date    CREATSERUM 0.75 02/01/2024         BUN Annually Lab Results   Component Value Date    BUN 23 02/01/2024       Drug Serum Conc Annually No results found for: \"DIGOXIN\", \"DIG\", \"VALP\"

## 2024-02-08 NOTE — PATIENT INSTRUCTIONS
Thank you for choosing [facility]  To Do:  FOR KATHLEEN MEHTA    Continue with Losartan  Home BP checks 1-2 x a week, BP goal is <140/90  Follow up in 6 months have blood tests done before next visit  Continue follow up with neurology in July as scheduled  Continue Sinemet  Recommend following up with Juliano Dow to try Prevacid once a day x 2 months  Recommend SHINGRIX vaccine for shingles x 2 doses, check with local pharmacy                        Kathleen Mehta's SCREENING SCHEDULE   Tests on this list are recommended by your physician but may not be covered, or covered at this frequency, by your insurer.   Please check with your insurance carrier before scheduling to verify coverage.   PREVENTATIVE SERVICES FREQUENCY &  COVERAGE DETAILS LAST COMPLETION DATE   Diabetes Screening    Fasting Blood Sugar /  Glucose    One screening every 12 months if never tested or if previously tested but not diagnosed with pre-diabetes   One screening every 6 months if diagnosed with pre-diabetes Lab Results   Component Value Date    GLUCOSE 96 11/20/2014    GLU 94 02/01/2024        Cardiovascular Disease Screening    Lipid Panel  Cholesterol  Lipoprotein (HDL)  Triglycerides Covered every 5 years for all Medicare beneficiaries without apparent signs or symptoms of cardiovascular disease Lab Results   Component Value Date    CHOLEST 212 (H) 02/01/2024    HDL 66 (H) 02/01/2024     (H) 02/01/2024    TRIG 93 02/01/2024         Electrocardiogram (EKG)   Covered if needed at Welcome to Medicare, and non-screening if indicated for medical reasons 06/28/2019      Ultrasound Screening for Abdominal Aortic Aneurysm (AAA) Covered once in a lifetime for one of the following risk factors    Men who are 65-75 years old and have ever smoked    Anyone with a family history -     Colorectal Cancer Screening  Covered for ages 50-85; only need ONE of the following:    Colonoscopy   Covered every 10 years    Covered every 2 years if  patient is at high risk or previous colonoscopy was abnormal 01/02/2018    Health Maintenance   Topic Date Due    Colorectal Cancer Screening  01/02/2028       Flexible Sigmoidoscopy   Covered every 4 years -    Fecal Occult Blood Test Covered annually -   Bone Density Screening    Bone density screening    Covered every 2 years after age 65 if diagnosed with risk of osteoporosis or estrogen deficiency.    Covered yearly for long-term glucocorticoid medication use (Steroids) Last Dexa Scan:    DEXA BONE DENSITY AXIAL (CPT=77080) 02/18/2021      No recommendations at this time   Pap and Pelvic    Pap   Covered every 2 years for women at normal risk; Annually if at high risk -  No recommendations at this time    Chlamydia Annually if high risk -  No recommendations at this time   Screening Mammogram    Mammogram     Recommend annually for all female patients aged 40 and older    One baseline mammogram covered for patients aged 35-39 01/30/2024    Health Maintenance   Topic Date Due    Mammogram  01/30/2025       Immunizations    Influenza Covered once per flu season  Please get every year 09/29/2023  No recommendations at this time    Pneumococcal Each vaccine (Nckyvzb39 & Xauthqedl35) covered once after 65 Prevnar 13: -    [id number]: 11/01/2021     No recommendations at this time    Hepatitis B One screening covered for patients with certain risk factors   -  No recommendations at this time    Tetanus Toxoid Not covered by Medicare Part B unless medically necessary (cut with metal); may be covered with your pharmacy prescription benefits -    Tetanus, Diptheria and Pertusis TD and TDaP Not covered by Medicare Part B -  No recommendations at this time    Zoster Not covered by Medicare Part B; may be covered with your pharmacy  prescription benefits -  Zoster Vaccines(1 of 2) Never done     Annual Monitoring of Persistent Medications (ACE/ARB, digoxin diuretics, anticonvulsants)    Potassium Annually Lab Results    Component Value Date    K 4.0 02/01/2024         Creatinine   Annually Lab Results   Component Value Date    CREATSERUM 0.75 02/01/2024         BUN Annually Lab Results   Component Value Date    BUN 23 02/01/2024       Drug Serum Conc Annually No results found for: \"DIGOXIN\", \"DIG\", \"VALP\"

## 2024-03-13 ENCOUNTER — TELEPHONE (OUTPATIENT)
Dept: FAMILY MEDICINE CLINIC | Facility: CLINIC | Age: 69
End: 2024-03-13

## 2024-03-13 NOTE — TELEPHONE ENCOUNTER
Pt called and said she has had a horrible cough for a few days and has not gotten much sleep. She is asking what she can do, she said she is afraid to take a cough suppressant for fear of getting pneumonia .    Tried to get pt to make an appt for an opening with kali tomorrow and she said she didn't feel it was necessary nor did she want to go anywhere sick

## 2024-03-13 NOTE — TELEPHONE ENCOUNTER
Called pt to obtain additional information. Pt states she always has a cough but for the past week cough has been worse. Pt also states she is \"choking on food and water\". This does not happen all the time but it does occur intermittently. Denies vomiting. +sick contacts- with same symptoms-cough and choking on food and water. Denies fevers. Has not tried otc cough medication as there was a label that states do not take if you have parkinsons and pt states she has that. Supportive measures discussed. Scheduled clement with PCP for 03/14 at 0830.

## 2024-03-14 ENCOUNTER — TELEPHONE (OUTPATIENT)
Dept: FAMILY MEDICINE CLINIC | Facility: CLINIC | Age: 69
End: 2024-03-14

## 2024-03-14 ENCOUNTER — OFFICE VISIT (OUTPATIENT)
Dept: FAMILY MEDICINE CLINIC | Facility: CLINIC | Age: 69
End: 2024-03-14
Payer: MEDICARE

## 2024-03-14 ENCOUNTER — HOSPITAL ENCOUNTER (OUTPATIENT)
Dept: GENERAL RADIOLOGY | Age: 69
Discharge: HOME | End: 2024-03-14
Attending: EMERGENCY MEDICINE
Payer: MEDICARE

## 2024-03-14 VITALS
HEART RATE: 94 BPM | BODY MASS INDEX: 24.88 KG/M2 | WEIGHT: 158.5 LBS | SYSTOLIC BLOOD PRESSURE: 114 MMHG | DIASTOLIC BLOOD PRESSURE: 68 MMHG | HEIGHT: 67 IN | OXYGEN SATURATION: 99 %

## 2024-03-14 DIAGNOSIS — J20.9 BRONCHOSPASM WITH BRONCHITIS, ACUTE: Primary | ICD-10-CM

## 2024-03-14 DIAGNOSIS — J20.9 BRONCHOSPASM WITH BRONCHITIS, ACUTE: ICD-10-CM

## 2024-03-14 PROCEDURE — 3078F DIAST BP <80 MM HG: CPT | Performed by: EMERGENCY MEDICINE

## 2024-03-14 PROCEDURE — 3008F BODY MASS INDEX DOCD: CPT | Performed by: EMERGENCY MEDICINE

## 2024-03-14 PROCEDURE — 99213 OFFICE O/P EST LOW 20 MIN: CPT | Performed by: EMERGENCY MEDICINE

## 2024-03-14 PROCEDURE — 71046 X-RAY EXAM CHEST 2 VIEWS: CPT | Performed by: EMERGENCY MEDICINE

## 2024-03-14 PROCEDURE — 1160F RVW MEDS BY RX/DR IN RCRD: CPT | Performed by: EMERGENCY MEDICINE

## 2024-03-14 PROCEDURE — 1159F MED LIST DOCD IN RCRD: CPT | Performed by: EMERGENCY MEDICINE

## 2024-03-14 PROCEDURE — 3074F SYST BP LT 130 MM HG: CPT | Performed by: EMERGENCY MEDICINE

## 2024-03-14 RX ORDER — FLUTICASONE PROPIONATE 110 UG/1
2 AEROSOL, METERED RESPIRATORY (INHALATION) 2 TIMES DAILY
Qty: 1 EACH | Refills: 1 | Status: SHIPPED | OUTPATIENT
Start: 2024-03-14

## 2024-03-14 RX ORDER — ALBUTEROL SULFATE 90 UG/1
1-2 AEROSOL, METERED RESPIRATORY (INHALATION) EVERY 4 HOURS PRN
Qty: 1 EACH | Refills: 0 | Status: SHIPPED | OUTPATIENT
Start: 2024-03-14

## 2024-03-14 NOTE — TELEPHONE ENCOUNTER
fluticasone propionate 110 MCG/ACT Inhalation Aerosol     Pt said she needs an alt medication for this per pharmacy

## 2024-03-14 NOTE — PATIENT INSTRUCTIONS
Thank you for choosing [facility]  To Do:  FOR MONICA MEHTA    Chest Xray  Start Flovent inhaler twice a day  Use albuterol inhaler as needed  May still take Over the counter robitussin or Mucinex for cough

## 2024-03-14 NOTE — PROGRESS NOTES
Chief Complaint:   Chief Complaint   Patient presents with    Cough     C/o cough, headache, mucus, sore throat x 1 wk     HPI:   This is a 68 year old female         COUGH    Cough for the past 1 week  No fever + gen fatigue  Cough productive of phlegm  No CP no SOB  + hears wheezing in the AM  + prev smoker  NO history of asthma   with similar Sx            PMSH       Past Medical History:   Diagnosis Date    ALLERGIC RHINITIS     Allergic rhinitis     Anesthesia complication     very slow to wake     Anxiety     Arthritis     Back problem     lower back    Cough 2010    Diverticulitis     Esophageal reflux     Essential hypertension     GERD 6/3/11:  Esophagitis    H/O mammogram ,09/15,    benign    History of endometrial biopsy 1502/15,    History of general anesthesia complication     slow to wake up    Hyperlipidemia     Osteoarthrosis, unspecified whether generalized or localized, unspecified site     Pap smear for cervical cancer screening ,10/15,    negative    Parkinson's disease 10/2019    Unspecified disorder of thyroid     thyroid nodules     Past Surgical History:   Procedure Laterality Date    ANESTH,SURGERY OF SHOULDER Left     SLAP tear, rotator cuff repair, moved a tendon    CHOLECYSTECTOMY      COLON SURGERY  01/15/2018    Low anterior colon resection proctoscopy lysis of adhesions    COLONOSCOPY  6/3/11= Normal    COLONOSCOPY  18= Diverticulosis    Repeat     COLONOSCOPY N/A 2018    Procedure: COLONOSCOPY, POSSIBLE BIOPSY, POSSIBLE POLYPECTOMY 36001;  Surgeon: Maxime Garcia MD;  [facility]    CYST REMOVAL Left     forearm    D & C      DILATION/CURETTAGE,DIAGNOSTIC      HYSTERECTOMY      partial hystero    LAPAROSCOPIC CHOLECYSTECTOMY N/A 2016    Procedure: LAPAROSCOPIC CHOLECYSTECTOMY;  Surgeon: Martinez Trejo MD;  Location: [facility] MAIN OR          OTHER      cystocele repair    OTHER SURGICAL HISTORY       cystocele repair 10 yrs ago    VAGINAL HYSTERECTOMY N/A 2017    Procedure: VAGINAL HYSTERECTOMY; ovaries intact     Social History:  Social History     Socioeconomic History    Marital status:     Number of children: 4   Tobacco Use    Smoking status: Former     Packs/day: 1.00     Years: 3.00     Additional pack years: 0.00     Total pack years: 3.00     Types: Cigarettes     Quit date: 1978     Years since quittin.3    Smokeless tobacco: Former   Vaping Use    Vaping Use: Never used   Substance and Sexual Activity    Alcohol use: Yes     Alcohol/week: 1.0 standard drink of alcohol     Types: 1 Glasses of wine per week     Comment: occ    Drug use: No    Sexual activity: Yes     Partners: Male   Other Topics Concern     Service No    Blood Transfusions No    Caffeine Concern Yes     Comment: 1/2 cup daily    Stress Concern No    Weight Concern No    Special Diet No    Exercise Yes     Comment: 3x days pool 5-7x balance/pT    Seat Belt Yes   Social History Narrative    Social History:      Marital Status and Family/Children: , 4 children, 2 siblings    Occupation/Financial Situation: housewife    Pets: 1 dog    Diet: no food allergies, concerned about blue cheese possible allergy    Exercise: 3x per week big moves for parkinson's, walk everyday    Caffeine: 1-2 cups per day    Water intake: 5-6 cups per day    Sleep: 8 or more mostly    SmokingHx: +former    Alcohol Use: rare    Drug Use: none     Family History:  Family History   Problem Relation Age of Onset    Hypertension Father         unknown    Other (Other) Father         COPD    Heart Disorder Mother         unknown    Other (Other) Mother         CVA    Diabetes Sister         unknown    Cancer Brother         lung     Allergies:  Allergies   Allergen Reactions    Flagyl [Metronidazole] NAUSEA AND VOMITING    Tylenol W/Codeine DIZZINESS     fainting    Other      Blue Cheese - Lip swelling    Pain Relief       Sensitivity to Pain meds    Vicodin [Hydrocodone-Acetaminophen]      'PASSED OUT\"     Current Meds:  Current Outpatient Medications on File Prior to Visit   Medication Sig Dispense Refill    carbidopa-levodopa  MG Oral Tab Take 1 tablet by mouth 3 (three) times daily. 270 tablet 1    losartan 50 MG Oral Tab Take 1 tablet (50 mg total) by mouth daily. 90 tablet 1    melatonin 5 MG Oral Cap Take 1 capsule (5 mg total) by mouth nightly as needed.      magnesium 250 MG Oral Tab Take 1 tablet (250 mg total) by mouth daily.      Multiple Vitamins-Minerals (HEALTHY EYES OR) Take 1 tablet by mouth daily.      Calcium Polycarbophil (FIBER) 625 MG Oral Tab Take 1 tablet (625 mg total) by mouth daily.      acidophilus-pectin Oral Cap Take 1 capsule by mouth daily.      fluticasone propionate 50 MCG/ACT Nasal Suspension 2 sprays by Each Nare route daily as needed for Rhinitis.      clobetasol 0.05 % External Ointment Apply sparingly at HS twice a week for 6 weeks then once a week at HS 30 g 0    cyclobenzaprine 10 MG Oral Tab Take 1 tablet (10 mg total) by mouth 3 (three) times daily as needed for Muscle spasms. 30 tablet 1    gabapentin 300 MG Oral Cap Take 1-3 capsules (300-900 mg total) by mouth daily. 3-4 times daily as directed. 270 capsule 0     No current facility-administered medications on file prior to visit.      Counseling given: Not Answered         PROBLEM LIST     Patient Active Problem List   Diagnosis    Cough: multifactorial (PLEASE SEE OVERVIEW FOR DETAILS) - Dr. Henao    Essential hypertension    Thyroid nodules [3 Rt; 3 Lt] [5 < 1.0 cm & 1 = 1.1 cm] / US (12/11) --> (12/16)  / FNA (2/12) - Janis &/or LATOSHA Gerard [PLEASE SEE OVERVIEW FOR DETAILS]    LPRD (laryngopharyngeal reflux disease) Rx:     Hypercholesterolemia [Pure] / Rx none LDL @ goal < 160 with diet.    Diastolic dysfunction / Dx by ECHO Cardiogram [11/10]    Elevated fasting glucose / HbA1C ~ 5.9%    S/P laparoscopic cholecystectomy  [1/16] - B. Green    VMR (vasomotor rhinitis)    Primary osteoarthritis of both first carpometacarpal joints    Trigger finger of left thumb    Abnormal electrocardiogram T inversion III & aVF (ECG) (EKG) --> Stress ECHO [5/17] normal    S/P [5/17] hysterectomy [ovaries present] for uterine prolapse - K. Ella    Shoulder impingement, left    Adhesive capsulitis of right shoulder    Adhesive capsulitis of left shoulder    Rotator cuff tendinitis, left    Mass of finger, left    Ganglion cyst of flexor tendon sheath of finger, left    Thyroid nodule    Chronic left-sided low back pain with left-sided sciatica    Mallet deformity of right ring finger    Parkinson's disease    Prediabetes         REVIEW OF SYSTEMS:   Review of systems significant for cough  The rest of the review of systems is negative except those stated as above    PHYSICAL EXAM:   /68   Pulse 94   Ht 5' 7\" (1.702 m)   Wt 158 lb 8 oz (71.9 kg)   SpO2 99%   BMI 24.82 kg/m²  Estimated body mass index is 24.82 kg/m² as calculated from the following:    Height as of this encounter: 5' 7\" (1.702 m).    Weight as of this encounter: 158 lb 8 oz (71.9 kg).   Vital signs reviewed.Appears stated age, well groomed.  GENERAL: well developed, well nourished, well hydrated, no distress coughs on occasion no conversational dyspnea and audible wheeze.  SKIN: good skin turgor, no obvious rashes  HEENT: atraumatic, normocephalic, ears, nose and throat are clear  EYES: sclera non icteric bilateral  NECK: supple, no adenopathy, no thyromegaly  LUNGS: Good air entry positive occasional end expiratory wheezing but no rhonchi no crackles.  CARDIO: RRR without murmur  EXTREMITIES: no cyanosis, clubbing or edema    No results found for this or any previous visit (from the past 672 hour(s)).        ASSESSMENT AND PLAN:       1. Bronchospasm with bronchitis, acute  - XR CHEST PA + LAT CHEST (CPT=71046); Future  - fluticasone propionate 110 MCG/ACT Inhalation  Aerosol; Inhale 2 puffs into the lungs 2 (two) times daily.  Dispense: 1 each; Refill: 1  - albuterol 108 (90 Base) MCG/ACT Inhalation Aero Soln; Inhale 1-2 puffs into the lungs every 4 (four) hours as needed for Wheezing or Shortness of Breath (cough).  Dispense: 1 each; Refill: 0    Recommend oral steroids with patient reports getting jittery and anxious in the past with oral prednisone.  Requests not to take oral steroids.  Will Rx Flovent.  Await chest x-ray.  Rx albuterol as needed.    PATIENT INSTRUCTIONS:    Chest Xray  Start Flovent inhaler twice a day  Use albuterol inhaler as needed  May still take Over the counter robitussin or Mucinex for cough      FOLLOW UP: in 2 weeks if not better          Health Maintenance Due   Topic Date Due    Zoster Vaccines (1 of 2) Never done

## 2024-03-18 ENCOUNTER — HOSPITAL ENCOUNTER (OUTPATIENT)
Age: 69
Discharge: HOME | End: 2024-03-18
Payer: MEDICARE

## 2024-03-18 VITALS
RESPIRATION RATE: 16 BRPM | OXYGEN SATURATION: 98 % | WEIGHT: 155 LBS | TEMPERATURE: 98 F | HEIGHT: 67 IN | SYSTOLIC BLOOD PRESSURE: 160 MMHG | BODY MASS INDEX: 24.33 KG/M2 | DIASTOLIC BLOOD PRESSURE: 85 MMHG | HEART RATE: 78 BPM

## 2024-03-18 DIAGNOSIS — B34.9 VIRAL SYNDROME: Primary | ICD-10-CM

## 2024-03-18 LAB — S PYO AG THROAT QL IA.RAPID: NEGATIVE

## 2024-03-18 PROCEDURE — 99212 OFFICE O/P EST SF 10 MIN: CPT

## 2024-03-18 PROCEDURE — 99213 OFFICE O/P EST LOW 20 MIN: CPT

## 2024-03-18 PROCEDURE — 87651 STREP A DNA AMP PROBE: CPT | Performed by: PHYSICIAN ASSISTANT

## 2024-03-18 NOTE — ED PROVIDER NOTES
Patient Seen in: [facility]      History     Chief Complaint   Patient presents with    Sore Throat     Stated Complaint: sore throat x 4 days    Subjective:   HPI    Patient states approximately 2 weeks ago she developed cough, congestion, fatigue.  Patient's  had similar symptoms that lasted for approximately 4 days.  She was seen by PCP 4 days ago and had a chest x-ray done which showed no pneumonia.  She was treated with albuterol and Flonase which has been helping her symptoms.  States over the last several days her sore throat has become worse.  Denies fevers, chills, vomiting, diarrhea.  Denies shortness of breath.  Denies any other complaints or concerns at this time.    Objective:   Past Medical History:   Diagnosis Date    ALLERGIC RHINITIS     Allergic rhinitis     Anesthesia complication     very slow to wake     Anxiety     Arthritis     Back problem     lower back    Cough 07/22/2010    Diverticulitis     Esophageal reflux     Essential hypertension     GERD 6/3/11:  Esophagitis    H/O mammogram 11/16,09/15,09/14    benign    History of endometrial biopsy 11/1502/15,02/14    History of general anesthesia complication     slow to wake up    Hyperlipidemia     Osteoarthrosis, unspecified whether generalized or localized, unspecified site     Pap smear for cervical cancer screening 12/16,10/15,08/13    negative    Parkinson's disease 10/2019    Unspecified disorder of thyroid     thyroid nodules              Past Surgical History:   Procedure Laterality Date    ANESTH,SURGERY OF SHOULDER Left     SLAP tear, rotator cuff repair, moved a tendon    CHOLECYSTECTOMY  2015    COLON SURGERY  01/15/2018    Low anterior colon resection proctoscopy lysis of adhesions    COLONOSCOPY  6/3/11= Normal    COLONOSCOPY  1/2/18= Diverticulosis    Repeat 2028    COLONOSCOPY N/A 01/02/2018    Procedure: COLONOSCOPY, POSSIBLE BIOPSY, POSSIBLE POLYPECTOMY 23691;  Surgeon: Maxime Garcia MD;  [facility]:  [facility]    CYST REMOVAL Left     forearm    D & C      DILATION/CURETTAGE,DIAGNOSTIC      HYSTERECTOMY  2015    partial hystero    LAPAROSCOPIC CHOLECYSTECTOMY N/A 2016    Procedure: LAPAROSCOPIC CHOLECYSTECTOMY;  Surgeon: Martinez Trejo MD;  Location: [facility] MAIN OR          OTHER      cystocele repair    OTHER SURGICAL HISTORY      cystocele repair 10 yrs ago    VAGINAL HYSTERECTOMY N/A 2017    Procedure: VAGINAL HYSTERECTOMY; ovaries intact                Social History     Socioeconomic History    Marital status:     Number of children: 4   Tobacco Use    Smoking status: Former     Packs/day: 1.00     Years: 3.00     Additional pack years: 0.00     Total pack years: 3.00     Types: Cigarettes     Quit date: 1978     Years since quittin.3    Smokeless tobacco: Former   Vaping Use    Vaping Use: Never used   Substance and Sexual Activity    Alcohol use: Yes     Alcohol/week: 1.0 standard drink of alcohol     Types: 1 Glasses of wine per week     Comment: occ    Drug use: No    Sexual activity: Yes     Partners: Male   Other Topics Concern     Service No    Blood Transfusions No    Caffeine Concern Yes     Comment: 1/2 cup daily    Stress Concern No    Weight Concern No    Special Diet No    Exercise Yes     Comment: 3x days pool 5-7x balance/pT    Seat Belt Yes   Social History Narrative    Social History:      Marital Status and Family/Children: , 4 children, 2 siblings    Occupation/Financial Situation: housewife    Pets: 1 dog    Diet: no food allergies, concerned about blue cheese possible allergy    Exercise: 3x per week big moves for parkinson's, walk everyday    Caffeine: 1-2 cups per day    Water intake: 5-6 cups per day    Sleep: 8 or more mostly    SmokingHx: +former    Alcohol Use: rare    Drug Use: none              Review of Systems    Positive for stated complaint: sore throat x 4 days  Other systems are as noted in HPI.  Constitutional and  vital signs reviewed.      All other systems reviewed and negative except as noted above.    Physical Exam     ED Triage Vitals [03/18/24 1550]   /85   Pulse 78   Resp 16   Temp 98.1 °F (36.7 °C)   Temp src Temporal   SpO2 98 %   O2 Device None (Room air)       Current:/85   Pulse 78   Temp 98.1 °F (36.7 °C) (Temporal)   Resp 16   Ht 170.2 cm (5' 7\")   Wt 70.3 kg   SpO2 98%   BMI 24.28 kg/m²         Physical Exam  Vitals and nursing note reviewed.   Constitutional:       Appearance: She is well-developed.   HENT:      Right Ear: Tympanic membrane normal.      Left Ear: Tympanic membrane normal.      Mouth/Throat:      Mouth: Mucous membranes are moist.      Pharynx: Uvula midline. Posterior oropharyngeal erythema (Mild) present. No pharyngeal swelling or oropharyngeal exudate.      Comments: +PND  Eyes:      Conjunctiva/sclera: Conjunctivae normal.   Cardiovascular:      Rate and Rhythm: Normal rate and regular rhythm.   Musculoskeletal:      Cervical back: Normal range of motion and neck supple.   Skin:     General: Skin is warm and dry.   Neurological:      General: No focal deficit present.      Mental Status: She is alert.   Psychiatric:         Mood and Affect: Mood normal.               ED Course     Labs Reviewed   RAPID STREP A - Normal                      MDM      Differential diagnosis includes but is not limited to COVID, influenza, URI, strep, mono, peritonsillar abscess.    Patient well-appearing, nontoxic, vital stable.  Discussed strep negative, do not recommend antibiotics.  Discussed COVID and flu testing but given the length of symptoms do not feel it is necessary at this time and ultimately patient declined.  I advised continued supportive care at home, prescriptions as prescribed by PCP and provided specific return precautions.  Patient verbalized understanding agreement plan.                                   Medical Decision Making      Disposition and Plan     Clinical  Impression:  1. Viral syndrome         Disposition:  Discharge  3/18/2024  4:41 pm    Follow-up:  Yenny Mariee MD  [id number] [address]  [phone]    In 3 days            Medications Prescribed:  Discharge Medication List as of 3/18/2024  4:32 PM

## 2024-03-18 NOTE — ED INITIAL ASSESSMENT (HPI)
Patient reports a sore throat for 4 days.  Patient reports her throat hurts whether she swallows or not.

## 2024-06-06 ENCOUNTER — TELEPHONE (OUTPATIENT)
Dept: FAMILY MEDICINE CLINIC | Facility: CLINIC | Age: 69
End: 2024-06-06

## 2024-06-06 DIAGNOSIS — M48.00 SPINAL STENOSIS, UNSPECIFIED SPINAL REGION: Primary | ICD-10-CM

## 2024-06-06 NOTE — TELEPHONE ENCOUNTER
Pt requesting referral to , [facility], for pt's spine. Stenosis of the spine and to pinched discs.

## 2024-06-15 NOTE — TELEPHONE ENCOUNTER
Is provider within [facility]? (In network)      Could refer to [facility]

## 2024-08-16 NOTE — PLAN OF CARE
GASTROINTESTINAL - ADULT    • Minimal or absence of nausea and vomiting Progressing        GENITOURINARY - ADULT    • Absence of urinary retention Progressing        Patient/Family Goals    • Patient/Family Long Term Goal Progressing        PT RESTING IN B Imaging Studies/Medications

## 2024-08-23 DIAGNOSIS — I10 ESSENTIAL HYPERTENSION: ICD-10-CM

## 2024-08-23 RX ORDER — LOSARTAN POTASSIUM 50 MG/1
50 TABLET ORAL DAILY
Qty: 90 TABLET | Refills: 3 | Status: SHIPPED | OUTPATIENT
Start: 2024-08-23

## 2024-08-27 ENCOUNTER — TELEPHONE (OUTPATIENT)
Dept: FAMILY MEDICINE CLINIC | Facility: CLINIC | Age: 69
End: 2024-08-27

## 2024-08-27 NOTE — TELEPHONE ENCOUNTER
Patient tested positive from COVID on 8/21/24, she is questioning when she can be around people.     She is reporting most of the same symptoms are still present when she was diagnosed, improvement is definitely showing.    She always has congestion and cough normally, so she wanted to know some guidelines after having it

## 2024-08-27 NOTE — TELEPHONE ENCOUNTER
Called patient  Discussed CDC covid guidelines and current symptoms.  Patient denies fevers and says she is overall better

## 2024-11-07 ENCOUNTER — OFFICE VISIT (OUTPATIENT)
Dept: FAMILY MEDICINE CLINIC | Facility: CLINIC | Age: 69
End: 2024-11-07
Payer: MEDICARE

## 2024-11-07 VITALS
RESPIRATION RATE: 16 BRPM | DIASTOLIC BLOOD PRESSURE: 70 MMHG | HEART RATE: 80 BPM | BODY MASS INDEX: 24 KG/M2 | SYSTOLIC BLOOD PRESSURE: 110 MMHG | OXYGEN SATURATION: 97 % | HEIGHT: 67 IN

## 2024-11-07 DIAGNOSIS — G89.29 CHRONIC BACK PAIN, UNSPECIFIED BACK LOCATION, UNSPECIFIED BACK PAIN LATERALITY: ICD-10-CM

## 2024-11-07 DIAGNOSIS — I10 ESSENTIAL HYPERTENSION: Primary | ICD-10-CM

## 2024-11-07 DIAGNOSIS — G20.A1 PARKINSON'S DISEASE WITHOUT DYSKINESIA, UNSPECIFIED WHETHER MANIFESTATIONS FLUCTUATE (HCC): ICD-10-CM

## 2024-11-07 DIAGNOSIS — M54.9 CHRONIC BACK PAIN, UNSPECIFIED BACK LOCATION, UNSPECIFIED BACK PAIN LATERALITY: ICD-10-CM

## 2024-11-07 PROCEDURE — 99213 OFFICE O/P EST LOW 20 MIN: CPT | Performed by: EMERGENCY MEDICINE

## 2024-11-07 PROCEDURE — 1159F MED LIST DOCD IN RCRD: CPT | Performed by: EMERGENCY MEDICINE

## 2024-11-07 PROCEDURE — 1170F FXNL STATUS ASSESSED: CPT | Performed by: EMERGENCY MEDICINE

## 2024-11-07 PROCEDURE — 1160F RVW MEDS BY RX/DR IN RCRD: CPT | Performed by: EMERGENCY MEDICINE

## 2024-11-07 PROCEDURE — 3078F DIAST BP <80 MM HG: CPT | Performed by: EMERGENCY MEDICINE

## 2024-11-07 PROCEDURE — G2211 COMPLEX E/M VISIT ADD ON: HCPCS | Performed by: EMERGENCY MEDICINE

## 2024-11-07 PROCEDURE — 1125F AMNT PAIN NOTED PAIN PRSNT: CPT | Performed by: EMERGENCY MEDICINE

## 2024-11-07 PROCEDURE — 3074F SYST BP LT 130 MM HG: CPT | Performed by: EMERGENCY MEDICINE

## 2024-11-07 NOTE — PROGRESS NOTES
Chief Complaint:   Chief Complaint   Patient presents with    Well Adult     HPI:   This is a 69 year old female       HYPERTENSION  On Losartan  Compliant with meds no new Sx  PT with known hx of Parkinsons  Patient reports good compliance.  Home BP checks normal    Currently follow up with Neurology for Parkinsons  Also with a history of chronic back pain has been following up with pain specialist and physiatrist.  Had received back injections which she states did not help.          PMSH       Past Medical History:    ALLERGIC RHINITIS    Allergic rhinitis    Anesthesia complication    very slow to wake     Anxiety    Arthritis    Back problem    lower back    Cough    Diverticulitis    Esophageal reflux    Essential hypertension    GERD    H/O mammogram    benign    History of endometrial biopsy    History of general anesthesia complication    slow to wake up    Hyperlipidemia    Osteoarthrosis, unspecified whether generalized or localized, unspecified site    Pap smear for cervical cancer screening    negative    Parkinson's disease (HCC)    Unspecified disorder of thyroid    thyroid nodules     Past Surgical History:   Procedure Laterality Date    Anesth,surgery of shoulder Left     SLAP tear, rotator cuff repair, moved a tendon    Cholecystectomy      Colon surgery  01/15/2018    Low anterior colon resection proctoscopy lysis of adhesions    Colonoscopy  6/3/11= Normal    Colonoscopy  18= Diverticulosis    Repeat     Colonoscopy N/A 2018    Procedure: COLONOSCOPY, POSSIBLE BIOPSY, POSSIBLE POLYPECTOMY 23170;  Surgeon: Maxime Garcia MD;  [facility]    Cyst removal Left     forearm    D & c      Dilation/curettage,diagnostic      Hysterectomy      partial hystero    Laparoscopic cholecystectomy N/A 2016    Procedure: LAPAROSCOPIC CHOLECYSTECTOMY;  Surgeon: Martinez Trejo MD;  Location: [facility] MAIN OR          Other      cystocele repair    Other surgical  history      cystocele repair 10 yrs ago    Vaginal hysterectomy N/A 2017    Procedure: VAGINAL HYSTERECTOMY; ovaries intact     Social History:  Social History     Socioeconomic History    Marital status:     Number of children: 4   Tobacco Use    Smoking status: Former     Current packs/day: 0.00     Average packs/day: 1 pack/day for 3.0 years (3.0 ttl pk-yrs)     Types: Cigarettes     Start date: 1975     Quit date: 1978     Years since quittin.0    Smokeless tobacco: Former   Vaping Use    Vaping status: Never Used   Substance and Sexual Activity    Alcohol use: Yes     Alcohol/week: 1.0 standard drink of alcohol     Types: 1 Glasses of wine per week     Comment: occ    Drug use: No    Sexual activity: Yes     Partners: Male   Other Topics Concern     Service No    Blood Transfusions No    Caffeine Concern Yes     Comment: 1/2 cup daily    Stress Concern No    Weight Concern No    Special Diet No    Exercise Yes     Comment: 3x days pool 5-7x balance/pT    Seat Belt Yes   Social History Narrative    Social History:      Marital Status and Family/Children: , 4 children, 2 siblings    Occupation/Financial Situation: housewife    Pets: 1 dog    Diet: no food allergies, concerned about blue cheese possible allergy    Exercise: 3x per week big moves for parkinson's, walk everyday    Caffeine: 1-2 cups per day    Water intake: 5-6 cups per day    Sleep: 8 or more mostly    SmokingHx: +former    Alcohol Use: rare    Drug Use: none     Social Drivers of Health      Received from [facility]    Housing Stability     Family History:  Family History   Problem Relation Age of Onset    Hypertension Father         unknown    Other (Other) Father         COPD    Heart Disorder Mother         unknown    Other (Other) Mother         CVA    Diabetes Sister         unknown    Cancer Brother         lung     Allergies:  Allergies[1]  Current Meds:  Medications Ordered  Prior to Encounter[2]   Counseling given: Not Answered         PROBLEM LIST     Patient Active Problem List   Diagnosis    Cough: multifactorial (PLEASE SEE OVERVIEW FOR DETAILS) - Dr. Henao    Essential hypertension    Thyroid nodules [3 Rt; 3 Lt] [5 < 1.0 cm & 1 = 1.1 cm] / US (12/11) --> (12/16)  / FNA (2/12) - Janis &/or LATOSHA Gerard [PLEASE SEE OVERVIEW FOR DETAILS]    LPRD (laryngopharyngeal reflux disease) Rx:     Hypercholesterolemia [Pure] / Rx none LDL @ goal < 160 with diet.    Diastolic dysfunction / Dx by ECHO Cardiogram [11/10]    Elevated fasting glucose / HbA1C ~ 5.9%    S/P laparoscopic cholecystectomy [1/16] - REY Trejo    VMR (vasomotor rhinitis)    Primary osteoarthritis of both first carpometacarpal joints    Trigger finger of left thumb    Abnormal electrocardiogram T inversion III & aVF (ECG) (EKG) --> Stress ECHO [5/17] normal    S/P [5/17] hysterectomy [ovaries present] for uterine prolapse - JESSICA Jaramillo    Shoulder impingement, left    Adhesive capsulitis of right shoulder    Adhesive capsulitis of left shoulder    Rotator cuff tendinitis, left    Mass of finger, left    Ganglion cyst of flexor tendon sheath of finger, left    Thyroid nodule    Chronic left-sided low back pain with left-sided sciatica    Mallet deformity of right ring finger    Parkinson's disease (HCC)    Prediabetes         REVIEW OF SYSTEMS:   Review of systems significant for back pain  The rest of the review of systems is negative except those stated as above    PHYSICAL EXAM:   /70   Pulse 80   Resp 16   Ht 5' 7\" (1.702 m)   SpO2 97%   BMI 24.28 kg/m²  Estimated body mass index is 24.28 kg/m² as calculated from the following:    Height as of this encounter: 5' 7\" (1.702 m).    Weight as of 3/18/24: 155 lb (70.3 kg).   Vital signs reviewed.Appears stated age, well groomed.  GENERAL: well developed, well nourished, well hydrated, no distress  SKIN: good skin turgor, no obvious rashes  HEENT: atraumatic,  normocephalic, ears, nose and throat are clear  EYES: sclera non icteric bilateral  NECK: supple, no adenopathy, no thyromegaly  LUNGS: clear to auscultation, no RRW  CARDIO: RRR without murmur  EXTREMITIES: no cyanosis, clubbing or edema      ASSESSMENT AND PLAN:         1. Essential hypertension    2. Chronic back pain, unspecified back location, unspecified back pain laterality    3. Parkinson's disease without dyskinesia, unspecified whether manifestations fluctuate (HCC)        PATIENT INSTRUCTIONS:      Contoinue with Losartan  Continue follow up with Physiatrist  May try increasing gabapentin dose  Follow up in Feb for Medicare annual Wellness  Monitor BP 1x a week    FOLLOW UP: Feb for medicare annual wellness               [1]   Allergies  Allergen Reactions    Flagyl [Metronidazole] NAUSEA AND VOMITING    Tylenol W/Codeine DIZZINESS     fainting    Other      Blue Cheese - Lip swelling    Pain Relief      Sensitivity to Pain meds    Vicodin [Hydrocodone-Acetaminophen]      'PASSED OUT\"   [2]   Current Outpatient Medications on File Prior to Visit   Medication Sig Dispense Refill    LOSARTAN 50 MG Oral Tab TAKE 1 TABLET EVERY DAY 90 tablet 3    carbidopa-levodopa  MG Oral Tab Take 1 tablet by mouth 3 (three) times daily. 270 tablet 1    melatonin 5 MG Oral Cap Take 1 capsule (5 mg total) by mouth nightly as needed.      magnesium 250 MG Oral Tab Take 1 tablet (250 mg total) by mouth daily.      Multiple Vitamins-Minerals (HEALTHY EYES OR) Take 1 tablet by mouth daily.      Calcium Polycarbophil (FIBER) 625 MG Oral Tab Take 1 tablet (625 mg total) by mouth daily.      acidophilus-pectin Oral Cap Take 1 capsule by mouth daily.      fluticasone propionate 50 MCG/ACT Nasal Suspension 2 sprays by Each Nare route daily as needed for Rhinitis.      clobetasol 0.05 % External Ointment Apply sparingly at HS twice a week for 6 weeks then once a week at HS 30 g 0    gabapentin 300 MG Oral Cap Take 1-3 capsules  (300-900 mg total) by mouth daily. 3-4 times daily as directed. 270 capsule 0     No current facility-administered medications on file prior to visit.

## 2024-11-07 NOTE — PATIENT INSTRUCTIONS
Thank you for choosing [facility]  To Do:  FOR MONICA Mayberry with Losartan  Continue follow up with Physiatrist  May try increasing gabapentin dose  Follow up in Feb for Medicare annual Wellness  Monitor BP 1x a week

## 2024-12-23 ENCOUNTER — TELEPHONE (OUTPATIENT)
Dept: FAMILY MEDICINE CLINIC | Facility: CLINIC | Age: 69
End: 2024-12-23

## 2024-12-23 DIAGNOSIS — G20.A1 PARKINSON'S DISEASE WITHOUT DYSKINESIA, UNSPECIFIED WHETHER MANIFESTATIONS FLUCTUATE (HCC): ICD-10-CM

## 2024-12-23 DIAGNOSIS — M48.00 SPINAL STENOSIS, UNSPECIFIED SPINAL REGION: Primary | ICD-10-CM

## 2024-12-23 NOTE — TELEPHONE ENCOUNTER
Patient is looking for another referral for her spinal stenosis     Saw Dr Lau earlier this year, would like to see June Perla with [facility] [phone]     Last OV 11/24

## 2024-12-23 NOTE — TELEPHONE ENCOUNTER
Patient requesting referral for Dr. Perla with [facility] for spinal stenosis  Saw Dr. Easton this year    LOV 11/7/24    Ok for referral?  pended

## 2024-12-24 NOTE — TELEPHONE ENCOUNTER
Referral placed and patient informed.  If she does not hear anything on this by 1/3/25 to contact our office

## 2025-01-14 ENCOUNTER — TELEPHONE (OUTPATIENT)
Dept: FAMILY MEDICINE CLINIC | Facility: CLINIC | Age: 70
End: 2025-01-14

## 2025-01-14 DIAGNOSIS — Z12.31 SCREENING MAMMOGRAM FOR BREAST CANCER: Primary | ICD-10-CM

## 2025-01-14 NOTE — TELEPHONE ENCOUNTER
Pt would like a second opinion, pt would like the name of a different Dr.- \"Physiatry\" - per pt. This is needed for Pt's back pain. Pt is aware that she does not need a referral to see someone else and specified that she would like a call back with information to  another Dr. Please advise.

## 2025-01-20 NOTE — TELEPHONE ENCOUNTER
Called patient with provider response.  She confirms understanding.     Patient also asked when she is due for a mammogram. Advised her that last mammo was 1/30/24, due now. Screening mammo order placed.     RECOMMENDATIONS:    ROUTINE MAMMOGRAM AND CLINICAL EVALUATION IN 12 MONTHS.

## 2025-01-22 ENCOUNTER — OFFICE VISIT (OUTPATIENT)
Dept: PHYSICAL MEDICINE AND REHAB | Facility: CLINIC | Age: 70
End: 2025-01-22
Payer: MEDICARE

## 2025-01-22 VITALS — WEIGHT: 160 LBS | BODY MASS INDEX: 25.11 KG/M2 | HEIGHT: 67 IN

## 2025-01-22 DIAGNOSIS — M79.18 MYOFASCIAL PAIN: ICD-10-CM

## 2025-01-22 DIAGNOSIS — G89.29 CHRONIC BILATERAL LOW BACK PAIN WITHOUT SCIATICA: Primary | ICD-10-CM

## 2025-01-22 DIAGNOSIS — K21.9 GASTROESOPHAGEAL REFLUX DISEASE WITHOUT ESOPHAGITIS: ICD-10-CM

## 2025-01-22 DIAGNOSIS — G20.A1 PARKINSON'S DISEASE WITHOUT DYSKINESIA, UNSPECIFIED WHETHER MANIFESTATIONS FLUCTUATE (HCC): ICD-10-CM

## 2025-01-22 DIAGNOSIS — F41.9 ANXIETY: ICD-10-CM

## 2025-01-22 DIAGNOSIS — M54.50 CHRONIC BILATERAL LOW BACK PAIN WITHOUT SCIATICA: Primary | ICD-10-CM

## 2025-01-22 PROCEDURE — 1125F AMNT PAIN NOTED PAIN PRSNT: CPT | Performed by: PHYSICAL MEDICINE & REHABILITATION

## 2025-01-22 PROCEDURE — 99203 OFFICE O/P NEW LOW 30 MIN: CPT | Performed by: PHYSICAL MEDICINE & REHABILITATION

## 2025-01-22 PROCEDURE — 3008F BODY MASS INDEX DOCD: CPT | Performed by: PHYSICAL MEDICINE & REHABILITATION

## 2025-01-22 PROCEDURE — 1159F MED LIST DOCD IN RCRD: CPT | Performed by: PHYSICAL MEDICINE & REHABILITATION

## 2025-01-22 NOTE — PROGRESS NOTES
[facility]  Progress Note    CHIEF COMPLAINT:    Chief Complaint   Patient presents with    Low Back Pain     New left handed patient c/o BL aching low back pain that started years ago without injury. Pain occasionally will radiate anterior down her legs to her knee. Has done PT for this in the past with temporary relief. Motrin, ice/heat PRN with some relief. Helps her sleep. Lumbar MRI 6/2024 in [place]. Denies N/T. LOP 4/10       History of Present Illness:  Kathleen Easton is a 69 year old female who is being seen in consultation at the request of Dr. Mariee.  Has a history of chronic back pain for many years.  At present the pain extends from the cervical spine to the lumbosacral junction.  No radiation to the arms or legs.  She does have a history of parkinsonism.  In the past she had been seen at [facility] by Dr. Joseph and Dr. Hood.  She has a lumbar MRI from 6 months ago.  She received facet injections in the past and was offered radiofrequency neurotomy but was concerned about that and wished to get a second opinion.    PAST MEDICAL HISTORY:  Past Medical History:    ALLERGIC RHINITIS    Allergic rhinitis    Anesthesia complication    very slow to wake     Anxiety    Arthritis    Back problem    lower back    Cough    Diverticulitis    Esophageal reflux    Essential hypertension    GERD    H/O mammogram    benign    History of endometrial biopsy    History of general anesthesia complication    slow to wake up    Hyperlipidemia    Osteoarthrosis, unspecified whether generalized or localized, unspecified site    Pap smear for cervical cancer screening    negative    Parkinson's disease (HCC)    Unspecified disorder of thyroid    thyroid nodules       SURGICAL HISTORY:  Past Surgical History:   Procedure Laterality Date    Anesth,surgery of shoulder Left     SLAP tear, rotator cuff repair, moved a tendon    Cholecystectomy  2015    Colon surgery  01/15/2018    Low  anterior colon resection proctoscopy lysis of adhesions    Colonoscopy  6/3/11= Normal    Colonoscopy  18= Diverticulosis    Repeat     Colonoscopy N/A 2018    Procedure: COLONOSCOPY, POSSIBLE BIOPSY, POSSIBLE POLYPECTOMY 01025;  Surgeon: Maxime Garcia MD;  Location: [facility]    Cyst removal Left     forearm    D & c      Dilation/curettage,diagnostic      Hysterectomy      partial hystero    Laparoscopic cholecystectomy N/A 2016    Procedure: LAPAROSCOPIC CHOLECYSTECTOMY;  Surgeon: Martinez Trejo MD;  Location: [facility] MAIN OR          Other      cystocele repair    Other surgical history      cystocele repair 10 yrs ago    Vaginal hysterectomy N/A 2017    Procedure: VAGINAL HYSTERECTOMY; ovaries intact       SOCIAL HISTORY:   Social History     Occupational History    Not on file   Tobacco Use    Smoking status: Former     Current packs/day: 0.00     Average packs/day: 1 pack/day for 3.0 years (3.0 ttl pk-yrs)     Types: Cigarettes     Start date: 1975     Quit date: 1978     Years since quittin.2    Smokeless tobacco: Former   Vaping Use    Vaping status: Never Used   Substance and Sexual Activity    Alcohol use: Yes     Alcohol/week: 1.0 standard drink of alcohol     Types: 1 Glasses of wine per week     Comment: occ    Drug use: No    Sexual activity: Yes     Partners: Male       CURRENT MEDICATIONS:   Current Outpatient Medications   Medication Sig Dispense Refill    LOSARTAN 50 MG Oral Tab TAKE 1 TABLET EVERY DAY 90 tablet 3    carbidopa-levodopa  MG Oral Tab Take 1 tablet by mouth 3 (three) times daily. 270 tablet 1    melatonin 5 MG Oral Cap Take 1 capsule (5 mg total) by mouth nightly as needed.      magnesium 250 MG Oral Tab Take 1 tablet (250 mg total) by mouth daily.      Multiple Vitamins-Minerals (HEALTHY EYES OR) Take 1 tablet by mouth daily.      Calcium Polycarbophil (FIBER) 625 MG Oral Tab Take 1 tablet (625 mg total) by mouth  daily.      acidophilus-pectin Oral Cap Take 1 capsule by mouth daily.      fluticasone propionate 50 MCG/ACT Nasal Suspension 2 sprays by Each Nare route daily as needed for Rhinitis.      clobetasol 0.05 % External Ointment Apply sparingly at HS twice a week for 6 weeks then once a week at HS 30 g 0    gabapentin 300 MG Oral Cap Take 1-3 capsules (300-900 mg total) by mouth daily. 3-4 times daily as directed. 270 capsule 0       ALLERGIES:   Allergies[1]    REVIEW OF SYSTEMS:   No patient-reported data collected this visit.            PHYSICAL EXAM:   Ht 67\"   Wt 160 lb (72.6 kg)   BMI 25.06 kg/m²     Body mass index is 25.06 kg/m².      General: No immediate distress, slow and rigid gait with short steps  Head: Normocephalic/ Atraumatic  Extremities: No lower extremity edema bilaterally. Peripheral pulses intact.   Spine:  full and painfree lumbar ROM in all directions, tender over the left thoracolumbar junction  Hips: full and painfree ROM   Neuro:   Cognition: alert & oriented x 3, attentive, able to follow 2 step commands, comprehention intact, spontaneous speech intact  Strength: Lower extremities have 5/5 strength  Sensation: Normal lower extremities  Reflexes: Normal lower extremities  SLR: neg    Data    Radiology Imaging:  I reviewed a lumbar MRI report from 6/1/24 in care everywhere done at [facility].  There is mild degenerative lumbar spondylosis most prominent at L4-L5. There is mild to moderate central canal stenosis with mild bilateral foraminal narrowing.       ASSESSMENT AND PLAN:  1. Chronic bilateral low back pain without sciatica  Patient's examination is normal.  Her MRI findings are minimal appropriate for age.  She has no claudication.  Facet injections have never worked.  Because of this, I do not feel the pathway to radiofrequency neurotomy is necessary or at the procedure would be helpful.  The patient was relieved to hear this.  The patient's pain appears to be primarily myofascial as she  has no restrictions of range of motion and no neurological abnormalities.  I recommended regular aerobic exercise 30 minutes/day, consider ambulation versus exercise bike.  She may get massages periodically, consider TENS unit or topical patches.  No need for surgery, injections or further medication as she is on a fair amount already.  The patient is welcome to return if symptoms become out of control.    2. Myofascial pain  As above    3. Parkinson's disease without dyskinesia, unspecified whether manifestations fluctuate (HCC)  May be contributing to sensation of muscle stiffness    4. Gastroesophageal reflux disease without esophagitis  No NSAIDs, limits treatment options    5. Anxiety  Negative comorbidity for painful conditions        RTC: As needed      The patient was in agreement with the assessment and plan.  All questions were answered.        John May MD  Physical Medicine and Rehabilitation/Sports Medicine  [facility]         [1]   Allergies  Allergen Reactions    Flagyl [Metronidazole] NAUSEA AND VOMITING    Tylenol W/Codeine DIZZINESS     fainting    Other      Blue Cheese - Lip swelling    Pain Relief      Sensitivity to Pain meds    Vicodin [Hydrocodone-Acetaminophen]      'PASSED OUT\"

## 2025-01-28 ENCOUNTER — TELEPHONE (OUTPATIENT)
Dept: FAMILY MEDICINE CLINIC | Facility: CLINIC | Age: 70
End: 2025-01-28

## 2025-01-30 ENCOUNTER — TELEPHONE (OUTPATIENT)
Dept: FAMILY MEDICINE CLINIC | Facility: CLINIC | Age: 70
End: 2025-01-30

## 2025-01-30 DIAGNOSIS — G89.29 CHRONIC BACK PAIN, UNSPECIFIED BACK LOCATION, UNSPECIFIED BACK PAIN LATERALITY: Primary | ICD-10-CM

## 2025-01-30 DIAGNOSIS — M54.9 CHRONIC BACK PAIN, UNSPECIFIED BACK LOCATION, UNSPECIFIED BACK PAIN LATERALITY: Primary | ICD-10-CM

## 2025-01-30 NOTE — TELEPHONE ENCOUNTER
Patient called, because she started her's acupuncture and physical therapy, and they told her to contact doctor and ask for the script for the therapy since she have Medicare insurance.     Please let patient know when the script will be ready.

## 2025-01-30 NOTE — TELEPHONE ENCOUNTER
17 PSRs - please call patient and confirm if she needs PT referral. Does she get acupuncture through PT or at another facility?  Need location of facility, phone and fax number and reason for referral.

## 2025-01-30 NOTE — TELEPHONE ENCOUNTER
Last appointment 11/7/24.   Patient requesting external PT referral to [facility].  Referral pended.

## 2025-01-30 NOTE — TELEPHONE ENCOUNTER
Spoke with the patient,  The therapy place name is:  [facility]  [facility] [address] Ortiz  [address] [address]  Phone [phone]  Fax [phone]    They need a script in order to continue the therapy since she has medicare ins.

## 2025-01-31 ENCOUNTER — HOSPITAL ENCOUNTER (OUTPATIENT)
Dept: MAMMOGRAPHY | Age: 70
Discharge: HOME | End: 2025-01-31
Attending: EMERGENCY MEDICINE
Payer: MEDICARE

## 2025-01-31 DIAGNOSIS — Z12.31 SCREENING MAMMOGRAM FOR BREAST CANCER: ICD-10-CM

## 2025-01-31 PROCEDURE — 77063 BREAST TOMOSYNTHESIS BI: CPT | Performed by: EMERGENCY MEDICINE

## 2025-01-31 PROCEDURE — 77067 SCR MAMMO BI INCL CAD: CPT | Performed by: EMERGENCY MEDICINE

## 2025-01-31 NOTE — TELEPHONE ENCOUNTER
I called patient and she said that it was not Dr Mariee who referred her for the PT. It was a podiatry doctor.   She is going to call to that doctor office for a script.    She is coming to see Dr. Mariee on the 2/11/25.

## 2025-01-31 NOTE — TELEPHONE ENCOUNTER
Spoke with patient regarding PT referral, states that she has not tried to contact her podiatrist. Patient states that she will wait to talk to  on 2/11/25 regarding physical therapy issue.

## 2025-01-31 NOTE — TELEPHONE ENCOUNTER
PT not signed  Chart reviewed, I have not ordered or referred patient to PT recently  Pls call patient and clarify.   Pt also recently evaluated by physiatry. Maybe physiatry ordered PT??

## 2025-02-03 ENCOUNTER — TELEPHONE (OUTPATIENT)
Dept: PHYSICAL MEDICINE AND REHAB | Facility: CLINIC | Age: 70
End: 2025-02-03

## 2025-02-04 NOTE — TELEPHONE ENCOUNTER
No therapy was necessary at her last visit.  I recommended 30 minutes of aerobic exercise per day, massages, TENS unit and topical patches.  If symptoms are changed, she is welcome to make a follow-up and we can discuss therapy if necessary.

## 2025-02-05 ENCOUNTER — TELEPHONE (OUTPATIENT)
Dept: FAMILY MEDICINE CLINIC | Facility: CLINIC | Age: 70
End: 2025-02-05

## 2025-02-05 DIAGNOSIS — M75.02 ADHESIVE CAPSULITIS OF LEFT SHOULDER: ICD-10-CM

## 2025-02-05 DIAGNOSIS — G20.A1 PARKINSON'S DISEASE WITHOUT DYSKINESIA, UNSPECIFIED WHETHER MANIFESTATIONS FLUCTUATE (HCC): ICD-10-CM

## 2025-02-05 DIAGNOSIS — G89.29 CHRONIC LEFT-SIDED LOW BACK PAIN WITH LEFT-SIDED SCIATICA: ICD-10-CM

## 2025-02-05 DIAGNOSIS — M54.42 CHRONIC LEFT-SIDED LOW BACK PAIN WITH LEFT-SIDED SCIATICA: ICD-10-CM

## 2025-02-05 DIAGNOSIS — M54.9 CHRONIC BACK PAIN, UNSPECIFIED BACK LOCATION, UNSPECIFIED BACK PAIN LATERALITY: Primary | ICD-10-CM

## 2025-02-05 DIAGNOSIS — G89.29 CHRONIC BACK PAIN, UNSPECIFIED BACK LOCATION, UNSPECIFIED BACK PAIN LATERALITY: Primary | ICD-10-CM

## 2025-02-05 DIAGNOSIS — M25.812 SHOULDER IMPINGEMENT, LEFT: ICD-10-CM

## 2025-02-05 DIAGNOSIS — M75.01 ADHESIVE CAPSULITIS OF RIGHT SHOULDER: ICD-10-CM

## 2025-02-05 NOTE — TELEPHONE ENCOUNTER
Dr. Mariee,    Patient is requesting an order for physical therapy.    Patient was previously going without PT order, but now it is required by Medicare.     Last office visit: 11/07/2024  Next office visit: 02/11/2025    Order pended, please review and sign if appropriate.    Thank you,  Arlen MAR CMA

## 2025-02-05 NOTE — TELEPHONE ENCOUNTER
Patient is requesting a referral to physical therapy, she has been going without a referral but now her medicare insurance is rejecting it and saying she needs a referral.     [facility]  [address], [place] [address]  Ph:[phone]

## 2025-02-11 ENCOUNTER — OFFICE VISIT (OUTPATIENT)
Dept: FAMILY MEDICINE CLINIC | Facility: CLINIC | Age: 70
End: 2025-02-11
Payer: MEDICARE

## 2025-02-11 VITALS
OXYGEN SATURATION: 97 % | DIASTOLIC BLOOD PRESSURE: 80 MMHG | WEIGHT: 163 LBS | BODY MASS INDEX: 25.58 KG/M2 | HEART RATE: 90 BPM | HEIGHT: 67 IN | SYSTOLIC BLOOD PRESSURE: 130 MMHG

## 2025-02-11 DIAGNOSIS — M54.50 CHRONIC BILATERAL LOW BACK PAIN WITHOUT SCIATICA: ICD-10-CM

## 2025-02-11 DIAGNOSIS — Z00.00 ENCOUNTER FOR ANNUAL HEALTH EXAMINATION: Primary | ICD-10-CM

## 2025-02-11 DIAGNOSIS — G89.29 CHRONIC BILATERAL LOW BACK PAIN WITHOUT SCIATICA: ICD-10-CM

## 2025-02-11 DIAGNOSIS — G20.A1 PARKINSON'S DISEASE WITHOUT DYSKINESIA, UNSPECIFIED WHETHER MANIFESTATIONS FLUCTUATE (HCC): ICD-10-CM

## 2025-02-11 DIAGNOSIS — I10 ESSENTIAL HYPERTENSION: ICD-10-CM

## 2025-02-11 PROBLEM — J30.0 VMR (VASOMOTOR RHINITIS): Status: RESOLVED | Noted: 2017-01-23 | Resolved: 2025-02-11

## 2025-02-11 PROBLEM — R73.03 PREDIABETES: Chronic | Status: RESOLVED | Noted: 2021-12-02 | Resolved: 2025-02-11

## 2025-02-11 PROCEDURE — G0439 PPPS, SUBSEQ VISIT: HCPCS | Performed by: EMERGENCY MEDICINE

## 2025-02-11 PROCEDURE — 3075F SYST BP GE 130 - 139MM HG: CPT | Performed by: EMERGENCY MEDICINE

## 2025-02-11 PROCEDURE — 96160 PT-FOCUSED HLTH RISK ASSMT: CPT | Performed by: EMERGENCY MEDICINE

## 2025-02-11 PROCEDURE — 3008F BODY MASS INDEX DOCD: CPT | Performed by: EMERGENCY MEDICINE

## 2025-02-11 PROCEDURE — 99214 OFFICE O/P EST MOD 30 MIN: CPT | Performed by: EMERGENCY MEDICINE

## 2025-02-11 PROCEDURE — 1159F MED LIST DOCD IN RCRD: CPT | Performed by: EMERGENCY MEDICINE

## 2025-02-11 PROCEDURE — 1160F RVW MEDS BY RX/DR IN RCRD: CPT | Performed by: EMERGENCY MEDICINE

## 2025-02-11 PROCEDURE — 3079F DIAST BP 80-89 MM HG: CPT | Performed by: EMERGENCY MEDICINE

## 2025-02-11 PROCEDURE — 1170F FXNL STATUS ASSESSED: CPT | Performed by: EMERGENCY MEDICINE

## 2025-02-11 PROCEDURE — 1125F AMNT PAIN NOTED PAIN PRSNT: CPT | Performed by: EMERGENCY MEDICINE

## 2025-02-11 RX ORDER — LOSARTAN POTASSIUM 50 MG/1
50 TABLET ORAL DAILY
Qty: 90 TABLET | Refills: 1 | Status: SHIPPED | OUTPATIENT
Start: 2025-02-11

## 2025-02-11 NOTE — PROGRESS NOTES
Subjective:   Kathleen Easton is a 69 year old female who presents for a Medicare Subsequent Annual Wellness visit (Pt already had Initial Annual Wellness) and scheduled follow up of multiple significant but stable problems        HYPERTENSION  ON losartan 50 mg  no CP no SOB, has chronic cough but this is not new  Compliant with all medications.  No home BP checks     PARKINSONS DISEASE  nO NEW MEDICATIONS  rECENTLY FOLLOWED UP WITH NUROLOGIST  On Sinemet 3 x a day  No recent falls        + chronic cough from laryngiospasm  Still taking gabapentin  Gabapentin helps but not completely  FF up with [facility]      BACK ACHE  Back pain for many years  Pain on and off, recently more bother some  In the past had visited with a chiropractor      History/Other:   Fall Risk Assessment:   She has been screened for Falls and is High Risk. Fall Prevention information provided to patient in After Visit Summary.    Do you feel unsteady when standing or walking?: (Patient-Rptd) No  Do you worry about falling?: (Patient-Rptd) Yes  Have you fallen in the past year?: (Patient-Rptd) No     Cognitive Assessment:   She had a completely normal cognitive assessment - see flowsheet entries     Functional Ability/Status:   Kathleen Easton has some abnormal functions as listed below:  She has Meal Preparation difficulties based on screening of functional status.She has difficulties Managing Money/Bills based on screening of functional status.She has Vision problems based on screening of functional status.       Depression Screening (PHQ):  PHQ-2 SCORE: 0  , done 2/10/2025        Advanced Directives:   She does have a Living Will but we do NOT have it on file in Epic.    She does have a POA but we do NOT have it on file in Epic.    Discussed Advance Care Planning with patient (and family/surrogate if present). Standard forms made available to patient in After Visit Summary.      Patient Active Problem List   Diagnosis    Cough: multifactorial  (PLEASE SEE OVERVIEW FOR DETAILS) - Dr. Henao    Essential hypertension    Thyroid nodules [3 Rt; 3 Lt] [5 < 1.0 cm & 1 = 1.1 cm] / US (12/11) --> (12/16)  / FNA (2/12) - Janis &/or LATOSHA Gerard [PLEASE SEE OVERVIEW FOR DETAILS]    LPRD (laryngopharyngeal reflux disease) Rx:     Hypercholesterolemia [Pure] / Rx none LDL @ goal < 160 with diet.    Diastolic dysfunction / Dx by ECHO Cardiogram [11/10]    Elevated fasting glucose / HbA1C ~ 5.9%    S/P laparoscopic cholecystectomy [1/16] - REY Trejo    Primary osteoarthritis of both first carpometacarpal joints    Trigger finger of left thumb    Abnormal electrocardiogram T inversion III & aVF (ECG) (EKG) --> Stress ECHO [5/17] normal    S/P [5/17] hysterectomy [ovaries present] for uterine prolapse - JESSICA Jaramillo    Shoulder impingement, left    Adhesive capsulitis of right shoulder    Adhesive capsulitis of left shoulder    Rotator cuff tendinitis, left    Mass of finger, left    Ganglion cyst of flexor tendon sheath of finger, left    Thyroid nodule    Chronic left-sided low back pain with left-sided sciatica    Mallet deformity of right ring finger    Parkinson's disease (HCC)     Allergies:  She is allergic to flagyl [metronidazole], tylenol w/codeine, other, pain relief, and vicodin [hydrocodone-acetaminophen].    Current Medications:  Outpatient Medications Marked as Taking for the 2/11/25 encounter (Office Visit) with Yenny Mariee MD   Medication Sig    losartan 50 MG Oral Tab Take 1 tablet (50 mg total) by mouth daily.    carbidopa-levodopa  MG Oral Tab Take 1 tablet by mouth 3 (three) times daily.    melatonin 5 MG Oral Cap Take 1 capsule (5 mg total) by mouth nightly as needed.    magnesium 250 MG Oral Tab Take 1 tablet (250 mg total) by mouth daily.    Multiple Vitamins-Minerals (HEALTHY EYES OR) Take 1 tablet by mouth daily.    Calcium Polycarbophil (FIBER) 625 MG Oral Tab Take 1 tablet (625 mg total) by mouth daily.    acidophilus-pectin Oral  Cap Take 1 capsule by mouth daily.    fluticasone propionate 50 MCG/ACT Nasal Suspension 2 sprays by Each Nare route daily as needed for Rhinitis.    clobetasol 0.05 % External Ointment Apply sparingly at HS twice a week for 6 weeks then once a week at HS    gabapentin 300 MG Oral Cap Take 1-3 capsules (300-900 mg total) by mouth daily. 3-4 times daily as directed.       Medical History:  She  has a past medical history of ALLERGIC RHINITIS, Allergic rhinitis, Anesthesia complication, Anxiety, Arthritis, Back problem, Cough (2010), Diverticulitis, Esophageal reflux, Essential hypertension, GERD (6/3/11:  Esophagitis), H/O mammogram (,09/15,), History of endometrial biopsy (1502/15,), History of general anesthesia complication, Hyperlipidemia, Osteoarthrosis, unspecified whether generalized or localized, unspecified site, Pap smear for cervical cancer screening (,10/15,), Parkinson's disease (HCC) (10/2019), and Unspecified disorder of thyroid.  Surgical History:  She  has a past surgical history that includes colonoscopy (6/3/11= Normal); other; dilation/curettage,diagnostic; Laparoscopic cholecystectomy (N/A, 2016); other surgical history; hysterectomy (); colonoscopy (18= Diverticulosis); Vaginal hysterectomy (N/A, 2017); colonoscopy (N/A, 2018); Colon surgery (01/15/2018); anesth,surgery of shoulder (Left); cyst removal (Left); cholecystectomy (); d & c; and .   Family History:  Her family history includes Cancer in her brother; Diabetes in her sister; Heart Disorder in her mother; Hypertension in her father; Other in her father and mother.  Social History:  She  reports that she quit smoking about 46 years ago. Her smoking use included cigarettes. She started smoking about 49 years ago. She has a 3 pack-year smoking history. She has never been exposed to tobacco smoke. She has quit using smokeless tobacco. She reports current alcohol use of  about 1.0 standard drink of alcohol per week. She reports that she does not use drugs.    Tobacco:  She smoked tobacco in the past but quit greater than 12 months ago.  Social History     Tobacco Use   Smoking Status Former    Current packs/day: 0.00    Average packs/day: 1 pack/day for 3.0 years (3.0 ttl pk-yrs)    Types: Cigarettes    Start date: 1975    Quit date: 1978    Years since quittin.2    Passive exposure: Never   Smokeless Tobacco Former          CAGE Alcohol Screen:   CAGE screening score of 0 on 2/10/2025, showing low risk of alcohol abuse.      Patient Care Team:  Yenny Mariee MD as PCP - General (Family Medicine)  Jabier Henao (OTOLARYNGOLOGY)  Remberto Gerard MD (Otolaryngologist (ENT))  Maxime Garcia MD as Consulting Physician (GASTROENTEROLOGY)  Luiz Evans MD as Consulting Physician (GASTROENTEROLOGY)  Avani Oreilly APN as Obstetrician (Nurse Practitioner Women's Health)    Review of Systems  GENERAL: feels well otherwise  SKIN: denies any unusual skin lesions  EYES: denies blurred vision or double vision  HEENT: denies nasal congestion, sinus pain or ST  LUNGS: denies shortness of breath with exertion  CARDIOVASCULAR: denies chest pain on exertion  GI: denies abdominal pain, denies heartburn  : denies dysuria, vaginal discharge or itching, no complaint of urinary incontinence   MUSCULOSKELETAL: denies back pain  NEURO: denies headaches  PSYCHE: denies depression or anxiety  HEMATOLOGIC: denies hx of anemia  ENDOCRINE: denies thyroid history  ALL/ASTHMA: denies hx of allergy or asthma    Objective:   Physical Exam  General Appearance:  Alert, cooperative, no distress, appears stated age   Head:  Normocephalic, without obvious abnormality, atraumatic   Eyes:  PERRL, conjunctiva/corneas clear, EOM's intact both eyes   Ears:  Normal TM's and external ear canals, both ears   Nose: Nares normal, septum midline,mucosa normal, no drainage or sinus tenderness    Throat: Lips, mucosa, and tongue normal; teeth and gums normal   Neck: Supple, symmetrical, trachea midline, no adenopathy;  thyroid: not enlarged, symmetric, no tenderness/mass/nodules; no carotid bruit or JVD   Back:   Symmetric, no curvature, ROM normal, no CVA tenderness   Lungs:   Clear to auscultation bilaterally, respirations unlabored   Heart:  Regular rate and rhythm, S1 and S2 normal, no murmur, rub, or gallop   Abdomen:   Soft, non-tender, bowel sounds active all four quadrants,  no masses, no organomegaly   Pelvic: Deferred   Extremities: Extremities normal, atraumatic, no cyanosis or edema   Pulses: 2+ and symmetric   Skin: Skin color, texture, turgor normal, no rashes or lesions   Lymph nodes: Cervical, supraclavicular, and axillary nodes normal   Neurologic: Normal       /80   Pulse 90   Ht 5' 7\" (1.702 m)   Wt 163 lb (73.9 kg)   SpO2 97%   BMI 25.53 kg/m²  Estimated body mass index is 25.53 kg/m² as calculated from the following:    Height as of this encounter: 5' 7\" (1.702 m).    Weight as of this encounter: 163 lb (73.9 kg).    Medicare Hearing Assessment:   Hearing Screening    Time taken: 2/11/2025 11:35 AM  Screening Method: Whisper Test  Whisper Test Result: Pass                 Assessment & Plan:   Kathleen Easton is a 69 year old female who presents for a Medicare Assessment.           1. Encounter for annual health examination    2. Essential hypertension  - losartan 50 MG Oral Tab; Take 1 tablet (50 mg total) by mouth daily.  Dispense: 90 tablet; Refill: 1  Stable, monitor for dysautonomia  Check BP daily    3. Chronic bilateral low back pain without sciatica  - Chiropractic Referral - In Network  Will refer to chiropractor.     4. Parkinson's disease without dyskinesia, unspecified whether manifestations fluctuate (HCC)  Plan per neurology          PATIENT INSTRUCTIONS:    MOnitor Blood pressure daily  Watch out for low blood pressure  Arrange follow up with chiropractor  Follow  up with [facility]  Continue with losartan  Follow up in 6 months sooner if needed  Have blood tests done      The patient indicates understanding of these issues and agrees to the plan.  Reinforced healthy diet, lifestyle, and exercise.      No follow-ups on file.     Yenny Mariee MD, 2/11/2025     Supplementary Documentation:   General Health:  In the past six months, have you lost more than 10 pounds without trying?: (Patient-Rptd) 2 - No  Has your appetite been poor?: (Patient-Rptd) No  Type of Diet: (Patient-Rptd) Balanced  How does the patient maintain a good energy level?: (Patient-Rptd) Appropriate Exercise;Daily Walks;Stretching  How would you describe your daily physical activity?: (Patient-Rptd) Moderate  How would you describe your current health state?: (Patient-Rptd) Fair  How do you maintain positive mental well-being?: (Patient-Rptd) Social Interaction;Games;Visiting Friends  On a scale of 0 to 10, with 0 being no pain and 10 being severe pain, what is your pain level?: (Patient-Rptd) 5 - (Moderate)  In the past six months, have you experienced urine leakage?: (Patient-Rptd) 0-No  At any time do you feel concerned for the safety/well-being of yourself and/or your children, in your home or elsewhere?: (Patient-Rptd) No  Have you had any immunizations at another office such as Influenza, Hepatitis B, Tetanus, or Pneumococcal?: (Patient-Rptd) Yes    Health Maintenance   Topic Date Due    Annual Well Visit  01/01/2025    Mammogram  01/31/2026    Colorectal Cancer Screening  01/02/2028    Influenza Vaccine  Completed    DEXA Scan  Completed    Annual Depression Screening  Completed    Fall Risk Screening (Annual)  Completed    Pneumococcal Vaccine: 50+ Years  Completed    Zoster Vaccines  Completed    COVID-19 Vaccine  Completed    Meningococcal B Vaccine  Aged Out

## 2025-02-11 NOTE — PATIENT INSTRUCTIONS
Thank you for choosing [facility]  To Do:  FOR MONICA MEHTA    MOnitor Blood pressure daily  Watch out for low blood pressure  Arrange follow up with chiropractor  Follow up with [facility]  Continue with losartan  Follow up in 6 months sooner if needed  Have blood tests done

## 2025-02-13 ENCOUNTER — LAB ENCOUNTER (OUTPATIENT)
Dept: LAB | Age: 70
End: 2025-02-13
Attending: EMERGENCY MEDICINE
Payer: MEDICARE

## 2025-02-13 DIAGNOSIS — Z00.00 ENCOUNTER FOR ANNUAL HEALTH EXAMINATION: ICD-10-CM

## 2025-02-13 LAB
ALBUMIN SERPL-MCNC: 4.2 G/DL (ref 3.2–4.8)
ALBUMIN/GLOB SERPL: 1.6 {RATIO} (ref 1–2)
ALP LIVER SERPL-CCNC: 87 U/L
ALT SERPL-CCNC: 25 U/L
ANION GAP SERPL CALC-SCNC: 10 MMOL/L (ref 0–18)
AST SERPL-CCNC: 25 U/L (ref ?–34)
BASOPHILS # BLD AUTO: 0.1 X10(3) UL (ref 0–0.2)
BASOPHILS NFR BLD AUTO: 2 %
BILIRUB SERPL-MCNC: 0.7 MG/DL (ref 0.2–1.1)
BUN BLD-MCNC: 21 MG/DL (ref 9–23)
CALCIUM BLD-MCNC: 9.9 MG/DL (ref 8.7–10.6)
CHLORIDE SERPL-SCNC: 103 MMOL/L (ref 98–112)
CHOLEST SERPL-MCNC: 195 MG/DL (ref ?–200)
CO2 SERPL-SCNC: 27 MMOL/L (ref 21–32)
CREAT BLD-MCNC: 0.85 MG/DL
EGFRCR SERPLBLD CKD-EPI 2021: 75 ML/MIN/1.73M2 (ref 60–?)
EOSINOPHIL # BLD AUTO: 0.19 X10(3) UL (ref 0–0.7)
EOSINOPHIL NFR BLD AUTO: 3.7 %
ERYTHROCYTE [DISTWIDTH] IN BLOOD BY AUTOMATED COUNT: 12.6 %
EST. AVERAGE GLUCOSE BLD GHB EST-MCNC: 117 MG/DL (ref 68–126)
FASTING PATIENT LIPID ANSWER: YES
FASTING STATUS PATIENT QL REPORTED: YES
GLOBULIN PLAS-MCNC: 2.6 G/DL (ref 2–3.5)
GLUCOSE BLD-MCNC: 92 MG/DL (ref 70–99)
HBA1C MFR BLD: 5.7 % (ref ?–5.7)
HCT VFR BLD AUTO: 42.6 %
HDLC SERPL-MCNC: 52 MG/DL (ref 40–59)
HGB BLD-MCNC: 13.7 G/DL
IMM GRANULOCYTES # BLD AUTO: 0.01 X10(3) UL (ref 0–1)
IMM GRANULOCYTES NFR BLD: 0.2 %
LDLC SERPL CALC-MCNC: 128 MG/DL (ref ?–100)
LYMPHOCYTES # BLD AUTO: 2.14 X10(3) UL (ref 1–4)
LYMPHOCYTES NFR BLD AUTO: 42.2 %
MCH RBC QN AUTO: 31.1 PG (ref 26–34)
MCHC RBC AUTO-ENTMCNC: 32.2 G/DL (ref 31–37)
MCV RBC AUTO: 96.6 FL
MONOCYTES # BLD AUTO: 0.47 X10(3) UL (ref 0.1–1)
MONOCYTES NFR BLD AUTO: 9.3 %
NEUTROPHILS # BLD AUTO: 2.16 X10 (3) UL (ref 1.5–7.7)
NEUTROPHILS # BLD AUTO: 2.16 X10(3) UL (ref 1.5–7.7)
NEUTROPHILS NFR BLD AUTO: 42.6 %
NONHDLC SERPL-MCNC: 143 MG/DL (ref ?–130)
OSMOLALITY SERPL CALC.SUM OF ELEC: 293 MOSM/KG (ref 275–295)
PLATELET # BLD AUTO: 284 10(3)UL (ref 150–450)
POTASSIUM SERPL-SCNC: 4 MMOL/L (ref 3.5–5.1)
PROT SERPL-MCNC: 6.8 G/DL (ref 5.7–8.2)
RBC # BLD AUTO: 4.41 X10(6)UL
SODIUM SERPL-SCNC: 140 MMOL/L (ref 136–145)
TRIGL SERPL-MCNC: 80 MG/DL (ref 30–149)
TSI SER-ACNC: 1.22 UIU/ML (ref 0.55–4.78)
VLDLC SERPL CALC-MCNC: 14 MG/DL (ref 0–30)
WBC # BLD AUTO: 5.1 X10(3) UL (ref 4–11)

## 2025-02-13 PROCEDURE — 85025 COMPLETE CBC W/AUTO DIFF WBC: CPT

## 2025-02-13 PROCEDURE — 36415 COLL VENOUS BLD VENIPUNCTURE: CPT

## 2025-02-13 PROCEDURE — 84443 ASSAY THYROID STIM HORMONE: CPT

## 2025-02-13 PROCEDURE — 80061 LIPID PANEL: CPT

## 2025-02-13 PROCEDURE — 83036 HEMOGLOBIN GLYCOSYLATED A1C: CPT

## 2025-02-13 PROCEDURE — 80053 COMPREHEN METABOLIC PANEL: CPT

## 2025-02-14 ENCOUNTER — APPOINTMENT (OUTPATIENT)
Dept: URBAN - METROPOLITAN AREA CLINIC 247 | Age: 70
Setting detail: DERMATOLOGY
End: 2025-02-14

## 2025-02-14 ENCOUNTER — TELEPHONE (OUTPATIENT)
Dept: FAMILY MEDICINE CLINIC | Facility: CLINIC | Age: 70
End: 2025-02-14

## 2025-02-14 DIAGNOSIS — L91.8 OTHER HYPERTROPHIC DISORDERS OF THE SKIN: ICD-10-CM

## 2025-02-14 DIAGNOSIS — D22 MELANOCYTIC NEVI: ICD-10-CM

## 2025-02-14 DIAGNOSIS — D18.0 HEMANGIOMA: ICD-10-CM

## 2025-02-14 DIAGNOSIS — L57.8 OTHER SKIN CHANGES DUE TO CHRONIC EXPOSURE TO NONIONIZING RADIATION: ICD-10-CM

## 2025-02-14 DIAGNOSIS — L82.1 OTHER SEBORRHEIC KERATOSIS: ICD-10-CM

## 2025-02-14 PROBLEM — D22.39 MELANOCYTIC NEVI OF OTHER PARTS OF FACE: Status: ACTIVE | Noted: 2025-02-14

## 2025-02-14 PROBLEM — D18.01 HEMANGIOMA OF SKIN AND SUBCUTANEOUS TISSUE: Status: ACTIVE | Noted: 2025-02-14

## 2025-02-14 PROCEDURE — OTHER COUNSELING: OTHER

## 2025-02-14 PROCEDURE — 99213 OFFICE O/P EST LOW 20 MIN: CPT

## 2025-02-14 PROCEDURE — OTHER MIPS QUALITY: OTHER

## 2025-02-14 ASSESSMENT — LOCATION SIMPLE DESCRIPTION DERM
LOCATION SIMPLE: RIGHT CHEEK
LOCATION SIMPLE: CHEST
LOCATION SIMPLE: LEFT ANTERIOR NECK
LOCATION SIMPLE: LEFT UPPER BACK
LOCATION SIMPLE: LEFT CHEEK

## 2025-02-14 ASSESSMENT — LOCATION DETAILED DESCRIPTION DERM
LOCATION DETAILED: LEFT MEDIAL UPPER BACK
LOCATION DETAILED: RIGHT MEDIAL SUPERIOR CHEST
LOCATION DETAILED: RIGHT CENTRAL MALAR CHEEK
LOCATION DETAILED: LEFT INFERIOR MEDIAL MALAR CHEEK
LOCATION DETAILED: UPPER STERNUM
LOCATION DETAILED: LEFT INFERIOR ANTERIOR NECK
LOCATION DETAILED: LEFT SUPERIOR MEDIAL UPPER BACK

## 2025-02-14 ASSESSMENT — LOCATION ZONE DERM
LOCATION ZONE: NECK
LOCATION ZONE: FACE
LOCATION ZONE: TRUNK

## 2025-02-14 NOTE — TELEPHONE ENCOUNTER
Centralized [facility] RFL - see below TE - patient requesting a different reason/wording on referral - cannot state Chiropractic, Dr. Huff is a Chiropractor however patient does NOT received Chiropractic care - she receives PT, TENS, Accupuncture, Massage

## 2025-02-14 NOTE — TELEPHONE ENCOUNTER
Patient called requesting different reason for Referral per [facility]. Patient does not receive chiropractic care but she does receive PT, Tens, Accupuncture, massage.    Please change referral to reflect this.      Please fax to [phone]    Thank you.

## 2025-02-20 ENCOUNTER — TELEPHONE (OUTPATIENT)
Dept: FAMILY MEDICINE CLINIC | Facility: CLINIC | Age: 70
End: 2025-02-20

## 2025-02-20 DIAGNOSIS — M54.50 CHRONIC BILATERAL LOW BACK PAIN WITHOUT SCIATICA: Primary | ICD-10-CM

## 2025-02-20 DIAGNOSIS — G89.29 CHRONIC BILATERAL LOW BACK PAIN WITHOUT SCIATICA: Primary | ICD-10-CM

## 2025-02-20 NOTE — TELEPHONE ENCOUNTER
Triage call transferred.   Spoke with pt f/u as had discussed with PCP request for PT referral to [facility] for chronic back pain, not chiropractor.  Informed will relay to PCP update and referral request.  Pt verbalized understanding and agreed with POC.    PT referral pended for your approval if ok.  Please review and advise. Thank you.

## 2025-03-14 ENCOUNTER — TELEPHONE (OUTPATIENT)
Dept: FAMILY MEDICINE CLINIC | Facility: CLINIC | Age: 70
End: 2025-03-14

## 2025-03-14 NOTE — TELEPHONE ENCOUNTER
See 2/11/25 office visit. Pt states she has been monitoring her BP twice a day for a month now. Pt asked what she is supposed to do with it. She doesn't want to keep doing this until she sees PCP for 6 month follow up. Reviewed below with pt:    PATIENT INSTRUCTIONS:     Monitor Blood pressure daily  Watch out for low blood pressure  Continue with losartan  Follow up in 6 months sooner if needed    Encouraged pt to transfer log into Down message for the past 2-4 weeks for Dr. Mariee to review. She may provide recommendations based on her BP monitoring. Pt voiced understanding.

## 2025-04-03 ENCOUNTER — OFFICE VISIT (OUTPATIENT)
Dept: FAMILY MEDICINE CLINIC | Facility: CLINIC | Age: 70
End: 2025-04-03
Payer: MEDICARE

## 2025-04-03 VITALS
HEART RATE: 76 BPM | HEIGHT: 67 IN | SYSTOLIC BLOOD PRESSURE: 150 MMHG | OXYGEN SATURATION: 95 % | BODY MASS INDEX: 25.9 KG/M2 | DIASTOLIC BLOOD PRESSURE: 90 MMHG | RESPIRATION RATE: 14 BRPM | WEIGHT: 165 LBS

## 2025-04-03 DIAGNOSIS — R05.3 CHRONIC COUGH: Primary | ICD-10-CM

## 2025-04-03 PROCEDURE — 3080F DIAST BP >= 90 MM HG: CPT | Performed by: EMERGENCY MEDICINE

## 2025-04-03 PROCEDURE — 3008F BODY MASS INDEX DOCD: CPT | Performed by: EMERGENCY MEDICINE

## 2025-04-03 PROCEDURE — G2211 COMPLEX E/M VISIT ADD ON: HCPCS | Performed by: EMERGENCY MEDICINE

## 2025-04-03 PROCEDURE — 3077F SYST BP >= 140 MM HG: CPT | Performed by: EMERGENCY MEDICINE

## 2025-04-03 PROCEDURE — 99213 OFFICE O/P EST LOW 20 MIN: CPT | Performed by: EMERGENCY MEDICINE

## 2025-04-03 PROCEDURE — 1160F RVW MEDS BY RX/DR IN RCRD: CPT | Performed by: EMERGENCY MEDICINE

## 2025-04-03 PROCEDURE — 1159F MED LIST DOCD IN RCRD: CPT | Performed by: EMERGENCY MEDICINE

## 2025-04-03 RX ORDER — BENZONATATE 200 MG/1
200 CAPSULE ORAL 3 TIMES DAILY PRN
Qty: 60 CAPSULE | Refills: 0 | Status: SHIPPED | OUTPATIENT
Start: 2025-04-03

## 2025-04-03 NOTE — PROGRESS NOTES
The following individual(s) verbally consented to be recorded using ambient AI listening technology and understand that they can each withdraw their consent to this listening technology at any point by asking the clinician to turn off or pause the recording:    Patient name: Kathleen EDIE Easton

## 2025-04-03 NOTE — PROGRESS NOTES
Subjective:   Kathleen Easton is a 69 year old female who presents for Medication Follow-Up         History/Other:   History of Present Illness  Shruthi Easton is a 69 year old female who presents with a chronic cough before a trip to [facility].    She has experienced a persistent cough for over thirty years, which is exacerbated by talking and eating. She is seeking to manage this symptom before her upcoming trip to [facility], where she will be part of a tour group of twenty people. She has been using mentholated Ricola Eucalyptus cough drops and Vicks for some relief.    She has tried various treatments for her cough, including Tessalon Perles in the past, which she recalls positively. She has also used Delsym cough syrup, which she finds helpful but is concerned about carrying it during her travels. She has a history of using albuterol inhalers and nebulizers, but these were not effective as she does not have asthma or COPD.    Currently, she is on gabapentin for the cough, taking two tablets in the morning, one in the afternoon occasionally, and two in the evening. Gabapentin has helped her manage the cough, allowing her to converse without coughing. However, she notes that if she does not eat or talk, she coughs less. Her last visit to the [facility] clinic was in December.    She is also managing Parkinson's disease, for which she takes carbidopa-levodopa. She recently increased her dose to one and a half tablets in the morning, one at noon, and one in the evening. She reports side effects of nausea and a sensation akin to carsickness, which she describes as 'not really dizzy where like the room's spinning, but just a little, I don't know, like carsick.' She is trying to adjust to the new dosage to improve her symptoms, particularly her freezing gait, as she prepares for her trip.           Chief Complaint Reviewed and Verified  No Further Nursing Notes to   Review  Tobacco Reviewed  Allergies Reviewed  Medications  Reviewed    Medical History Reviewed  Surgical History Reviewed  OB Status Reviewed    Family History Reviewed  Social History Reviewed         Tobacco:  She smoked tobacco in the past but quit greater than 12 months ago.  Social History     Tobacco Use   Smoking Status Former    Current packs/day: 0.00    Average packs/day: 1 pack/day for 3.0 years (3.0 ttl pk-yrs)    Types: Cigarettes    Start date: 1975    Quit date: 1978    Years since quittin.4    Passive exposure: Never   Smokeless Tobacco Former        Current Outpatient Medications   Medication Sig Dispense Refill    benzonatate 200 MG Oral Cap Take 1 capsule (200 mg total) by mouth 3 (three) times daily as needed for cough. 60 capsule 0    losartan 50 MG Oral Tab Take 1 tablet (50 mg total) by mouth daily. 90 tablet 1    carbidopa-levodopa  MG Oral Tab Take 1 tablet by mouth 3 (three) times daily. 270 tablet 1    melatonin 5 MG Oral Cap Take 1 capsule (5 mg total) by mouth nightly as needed.      magnesium 250 MG Oral Tab Take 1 tablet (250 mg total) by mouth daily.      Multiple Vitamins-Minerals (HEALTHY EYES OR) Take 1 tablet by mouth daily.      Calcium Polycarbophil (FIBER) 625 MG Oral Tab Take 1 tablet (625 mg total) by mouth daily.      acidophilus-pectin Oral Cap Take 1 capsule by mouth daily.      fluticasone propionate 50 MCG/ACT Nasal Suspension 2 sprays by Each Nare route daily as needed for Rhinitis.      clobetasol 0.05 % External Ointment Apply sparingly at HS twice a week for 6 weeks then once a week at HS 30 g 0    gabapentin 300 MG Oral Cap Take 1-3 capsules (300-900 mg total) by mouth daily. 3-4 times daily as directed. 270 capsule 0           Objective:   /90   Pulse 76   Resp 14   Ht 5' 7\" (1.702 m)   Wt 165 lb (74.8 kg)   SpO2 95%   BMI 25.84 kg/m²  Estimated body mass index is 25.84 kg/m² as calculated from the following:    Height as of this encounter: 5' 7\" (1.702 m).    Weight as of this  encounter: 165 lb (74.8 kg).  Physical Exam      Assessment & Plan:   1. Chronic cough (Primary)  Overview:  Perennial rhinitis  GERD/LPRD  PFT's normal [12/10] - FEV - 1: 2.48 L/sec; FEV - 1 / FVC = 97% predicted   Neurogenic  Orders:  -     Benzonatate; Take 1 capsule (200 mg total) by mouth 3 (three) times daily as needed for cough.  Dispense: 60 capsule; Refill: 0    Assessment & Plan  Chronic Cough  Chronic cough managed with gabapentin, effective in reducing frequency. Concerns about managing cough during upcoming trip to [facility]. Gabapentin dosage: two tablets in the morning, one in the afternoon (occasionally), and two in the evening. Increasing gabapentin may cause dizziness and sleepiness. Tessalon Perles discussed as non-sedative option.  - Prescribed Tessalon Perles (benzonatate) 200 mg, one tablet three times a day as needed for cough, 60 tablets total.  - Advised additional gabapentin dose in the afternoon during the trip if no excessive drowsiness.  - Consider Delsym or Mucinex DM if Tessalon Perles is ineffective.    Parkinson's Disease  Parkinson's disease with freezing gait, trunk tremors, and stiffness. Current carbidopa/levodopa regimen increased but causing nausea. Not yet at target dose. Discussed adjusting dosage to improve tolerance and maintain symptom control.  - Consider adjusting carbidopa/levodopa dosage to one and a quarter tablets three times a day to improve tolerance.  - Monitor symptoms and adjust dosage as needed to optimize mobility before the trip.      No follow-ups on file.      Yenny Mariee MD, 4/3/2025, 4:22 PM

## 2025-05-22 ENCOUNTER — TELEPHONE (OUTPATIENT)
Dept: FAMILY MEDICINE CLINIC | Facility: CLINIC | Age: 70
End: 2025-05-22

## 2025-05-22 DIAGNOSIS — K21.9 LPRD (LARYNGOPHARYNGEAL REFLUX DISEASE): ICD-10-CM

## 2025-05-22 RX ORDER — GABAPENTIN 300 MG/1
300 CAPSULE ORAL 3 TIMES DAILY
Qty: 270 CAPSULE | Refills: 1 | Status: SHIPPED | OUTPATIENT
Start: 2025-05-22

## 2025-05-22 NOTE — TELEPHONE ENCOUNTER
Please review directions on Gabapentin 300MG CAP, pharmacy needs clarifying dose and frequency.       Gabapentin 300MG CAP   SIG: Take 1-3 capsules by mouth daily. 3-4 times a day as directed.   Sent 5/20/25

## 2025-05-22 NOTE — TELEPHONE ENCOUNTER
Clarification needed for the following medication    Gabapentin 300MG CAP    \"Rx for Gabapentin 300MG CAP directions unclear please clarify dose and dosing frequency.\"    [facility] - [place], [place] - [address] [phone], [phone]  [address] [facility] [address]  Phone: [phone] Fax: [phone]  Hours: Not open 24 hours

## 2025-05-27 ENCOUNTER — LAB ENCOUNTER (OUTPATIENT)
Dept: LAB | Age: 70
End: 2025-05-27
Attending: NURSE PRACTITIONER
Payer: MEDICARE

## 2025-05-27 ENCOUNTER — OFFICE VISIT (OUTPATIENT)
Dept: FAMILY MEDICINE CLINIC | Facility: CLINIC | Age: 70
End: 2025-05-27
Payer: MEDICARE

## 2025-05-27 ENCOUNTER — NURSE TRIAGE (OUTPATIENT)
Dept: FAMILY MEDICINE CLINIC | Facility: CLINIC | Age: 70
End: 2025-05-27

## 2025-05-27 VITALS
BODY MASS INDEX: 24.64 KG/M2 | RESPIRATION RATE: 16 BRPM | WEIGHT: 157 LBS | HEART RATE: 84 BPM | HEIGHT: 67 IN | OXYGEN SATURATION: 98 % | SYSTOLIC BLOOD PRESSURE: 122 MMHG | DIASTOLIC BLOOD PRESSURE: 80 MMHG

## 2025-05-27 DIAGNOSIS — Z13.0 SCREENING FOR IRON DEFICIENCY ANEMIA: ICD-10-CM

## 2025-05-27 DIAGNOSIS — I95.9 HYPOTENSION, UNSPECIFIED HYPOTENSION TYPE: Primary | ICD-10-CM

## 2025-05-27 DIAGNOSIS — I95.9 HYPOTENSION, UNSPECIFIED HYPOTENSION TYPE: ICD-10-CM

## 2025-05-27 LAB
ALBUMIN SERPL-MCNC: 4.5 G/DL (ref 3.2–4.8)
ALBUMIN/GLOB SERPL: 1.7 {RATIO} (ref 1–2)
ALP LIVER SERPL-CCNC: 90 U/L (ref 55–142)
ALT SERPL-CCNC: 10 U/L (ref 10–49)
ANION GAP SERPL CALC-SCNC: 10 MMOL/L (ref 0–18)
AST SERPL-CCNC: 22 U/L (ref ?–34)
ATRIAL RATE: 71 BPM
BASOPHILS # BLD AUTO: 0.08 X10(3) UL (ref 0–0.2)
BASOPHILS NFR BLD AUTO: 1.1 %
BILIRUB SERPL-MCNC: 0.5 MG/DL (ref 0.2–1.1)
BUN BLD-MCNC: 21 MG/DL (ref 9–23)
CALCIUM BLD-MCNC: 10 MG/DL (ref 8.7–10.6)
CHLORIDE SERPL-SCNC: 102 MMOL/L (ref 98–112)
CO2 SERPL-SCNC: 29 MMOL/L (ref 21–32)
CREAT BLD-MCNC: 0.87 MG/DL (ref 0.55–1.02)
DEPRECATED HBV CORE AB SER IA-ACNC: 72 NG/ML (ref 50–306)
EGFRCR SERPLBLD CKD-EPI 2021: 73 ML/MIN/1.73M2 (ref 60–?)
EOSINOPHIL # BLD AUTO: 0.14 X10(3) UL (ref 0–0.7)
EOSINOPHIL NFR BLD AUTO: 2 %
ERYTHROCYTE [DISTWIDTH] IN BLOOD BY AUTOMATED COUNT: 12.5 %
FASTING STATUS PATIENT QL REPORTED: NO
GLOBULIN PLAS-MCNC: 2.7 G/DL (ref 2–3.5)
GLUCOSE BLD-MCNC: 92 MG/DL (ref 70–99)
HCT VFR BLD AUTO: 43.1 % (ref 35–48)
HGB BLD-MCNC: 13.8 G/DL (ref 12–16)
IMM GRANULOCYTES # BLD AUTO: 0.02 X10(3) UL (ref 0–1)
IMM GRANULOCYTES NFR BLD: 0.3 %
IRON SATN MFR SERPL: 40 % (ref 15–50)
IRON SERPL-MCNC: 117 UG/DL (ref 50–170)
LYMPHOCYTES # BLD AUTO: 2.3 X10(3) UL (ref 1–4)
LYMPHOCYTES NFR BLD AUTO: 32.5 %
MCH RBC QN AUTO: 30.9 PG (ref 26–34)
MCHC RBC AUTO-ENTMCNC: 32 G/DL (ref 31–37)
MCV RBC AUTO: 96.6 FL (ref 80–100)
MONOCYTES # BLD AUTO: 0.62 X10(3) UL (ref 0.1–1)
MONOCYTES NFR BLD AUTO: 8.8 %
NEUTROPHILS # BLD AUTO: 3.92 X10 (3) UL (ref 1.5–7.7)
NEUTROPHILS # BLD AUTO: 3.92 X10(3) UL (ref 1.5–7.7)
NEUTROPHILS NFR BLD AUTO: 55.3 %
OSMOLALITY SERPL CALC.SUM OF ELEC: 295 MOSM/KG (ref 275–295)
P AXIS: 57 DEGREES
P-R INTERVAL: 154 MS
PLATELET # BLD AUTO: 305 10(3)UL (ref 150–450)
POTASSIUM SERPL-SCNC: 3.9 MMOL/L (ref 3.5–5.1)
PROT SERPL-MCNC: 7.2 G/DL (ref 5.7–8.2)
Q-T INTERVAL: 376 MS
QRS DURATION: 90 MS
QTC CALCULATION (BEZET): 408 MS
R AXIS: 39 DEGREES
RBC # BLD AUTO: 4.46 X10(6)UL (ref 3.8–5.3)
SODIUM SERPL-SCNC: 141 MMOL/L (ref 136–145)
T AXIS: 42 DEGREES
TOTAL IRON BINDING CAPACITY: 296 UG/DL (ref 250–425)
TRANSFERRIN SERPL-MCNC: 238 MG/DL (ref 250–380)
VENTRICULAR RATE: 71 BPM
WBC # BLD AUTO: 7.1 X10(3) UL (ref 4–11)

## 2025-05-27 PROCEDURE — 99214 OFFICE O/P EST MOD 30 MIN: CPT | Performed by: NURSE PRACTITIONER

## 2025-05-27 PROCEDURE — 1159F MED LIST DOCD IN RCRD: CPT | Performed by: NURSE PRACTITIONER

## 2025-05-27 PROCEDURE — 3074F SYST BP LT 130 MM HG: CPT | Performed by: NURSE PRACTITIONER

## 2025-05-27 PROCEDURE — 82728 ASSAY OF FERRITIN: CPT

## 2025-05-27 PROCEDURE — 36415 COLL VENOUS BLD VENIPUNCTURE: CPT

## 2025-05-27 PROCEDURE — 83540 ASSAY OF IRON: CPT

## 2025-05-27 PROCEDURE — 93000 ELECTROCARDIOGRAM COMPLETE: CPT | Performed by: NURSE PRACTITIONER

## 2025-05-27 PROCEDURE — 85025 COMPLETE CBC W/AUTO DIFF WBC: CPT

## 2025-05-27 PROCEDURE — 3008F BODY MASS INDEX DOCD: CPT | Performed by: NURSE PRACTITIONER

## 2025-05-27 PROCEDURE — 3079F DIAST BP 80-89 MM HG: CPT | Performed by: NURSE PRACTITIONER

## 2025-05-27 PROCEDURE — 80053 COMPREHEN METABOLIC PANEL: CPT

## 2025-05-27 PROCEDURE — 83550 IRON BINDING TEST: CPT

## 2025-05-27 RX ORDER — LOSARTAN POTASSIUM 25 MG/1
25 TABLET ORAL DAILY
Qty: 90 TABLET | Refills: 0 | Status: SHIPPED | OUTPATIENT
Start: 2025-05-27 | End: 2025-08-25

## 2025-05-27 RX ORDER — LOSARTAN POTASSIUM 25 MG/1
25 TABLET ORAL DAILY
Qty: 90 TABLET | Refills: 0 | Status: SHIPPED | OUTPATIENT
Start: 2025-05-27 | End: 2025-05-27

## 2025-05-27 NOTE — TELEPHONE ENCOUNTER
Action Requested: Summary for Provider     []  Critical Lab, Recommendations Needed  [] Need Additional Advice  []   FYI    []   Need Orders  [] Need Medications Sent to Pharmacy  []  Other     SUMMARY: appointment scheduled for today    Reason for call: Hypotension  Onset: 5/23/25     Patient called stating her blood pressure has been low.   She is taking losartan 50mg daily  States her symptoms first started on 5/23 she felt faint and took her blood pressure and it was 100/86      Today she felt light headed earlier and checked her blood pressure and it is 97/86  She took her losartan this morning, states she has been taking this everyday. She does not check her blood pressure before taking her losartan.    Discussed urgent care vs provider visit due to patient having occasional lightheadedness. Patient does not want to go to urgent care, agrees to appointment with Emily GARCIA today at 3pm.    LOV 5/20/25  B/P 120/80         Reason for Disposition   Systolic BP  while taking blood pressure medications and NOT dizzy, lightheaded or weak    Protocols used: Blood Pressure - Low-A-[place]    Future Appointments   Date Time Provider Department Center   5/27/2025  3:00 PM Emily Joseph APRN [facility]

## 2025-05-27 NOTE — PROGRESS NOTES
Kathleen Easton is a 69 year old female.     HPI:   Patient presents for  hypotenssion . Reports May 23 rd felt dizziness and faint , took her blood pressure and it was 100/86 . Reports then she took it today and blood pressure was 97/86Pt has been taking medications Losartan 50 mg  as instructed, no medication side effects Pt denies chest pain, shortness of breath, palpitations, orthopnea, peripheral edema, headaches, blurry vision.     Wt Readings from Last 6 Encounters:   05/27/25 157 lb (71.2 kg)   05/20/25 157 lb (71.2 kg)   04/03/25 165 lb (74.8 kg)   02/11/25 163 lb (73.9 kg)   01/22/25 160 lb (72.6 kg)   03/18/24 155 lb (70.3 kg)     Body mass index is 24.59 kg/m².    Cholesterol, Total (mg/dL)   Date Value   02/13/2025 195   02/01/2024 212 (H)   08/02/2022 206 (H)   11/20/2014 193   11/20/2013 185   06/13/2013 218 (H)     Cholesterol (mg/dL)   Date Value   11/08/2021 203.00 (H)   11/17/2020 208.00 (H)   10/17/2017 219 (H)     HDL Cholesterol (mg/dL)   Date Value   02/13/2025 52   02/01/2024 66 (H)   08/02/2022 55   11/20/2014 56   11/20/2013 54   06/13/2013 59     Direct HDL (mg/dL)   Date Value   11/08/2021 49   11/17/2020 60   10/17/2017 66     LDL Cholesterol Calc (mg/dL)   Date Value   11/20/2014 122 (H)   11/20/2013 117 (H)   06/13/2013 146 (H)     LDL Cholesterol (mg/dL)   Date Value   02/13/2025 128 (H)   02/01/2024 130 (H)   08/02/2022 132 (H)     Calculated LDL (mg/dL)   Date Value   11/08/2021 130   11/17/2020 135 (H)   10/17/2017 140 (H)     Triglycerides (mg/dL)   Date Value   11/20/2014 74   11/20/2013 69   06/13/2013 64     AST (SGOT) (IU/L)   Date Value   11/20/2014 20   11/20/2013 23   12/03/2012 23     AST (U/L)   Date Value   02/13/2025 25   02/01/2024 22   08/02/2022 18   11/08/2021 32   11/17/2020 19   11/01/2019 25     ALT (SGPT) (IU/L)   Date Value   11/20/2014 15   11/20/2013 18   12/03/2012 24     ALT (U/L)   Date Value   02/13/2025 25   02/01/2024 18   08/02/2022 21   11/08/2021 57 (H)    11/17/2020 13   11/01/2019 33       Current Medications[1]   Past Medical History[2]   Past Surgical History[3]   Social History:    Short Social Hx on File[4]      REVIEW OF SYSTEMS:   GENERAL HEALTH: feels well otherwise, no weight change  EYE: no abnormal vision.  RESPIRATORY: denies shortness of breath  CARDIOVASCULAR: denies chest pain, palpitations or orthopnea, no edema.  GI: denies abdominal pain and denies heartburn  NEURO: denies headaches, abnormal sensation.  EXT: Denies swelling    EXAM:   /80   Pulse 84   Resp 16   Ht 5' 7\" (1.702 m)   Wt 157 lb (71.2 kg)   SpO2 98%   BMI 24.59 kg/m²   GENERAL: well developed, well nourished, in no apparent distress  EYES; PERRLA, EOM-I.  SKIN: no rashes, no suspicious lesions  HEENT: atraumatic, normocephalic, TMs pearly gray without erythema or edema, oropharynx not erythematous, no exudates.  NECK: supple, no adenopathy, no bruits  LUNGS: CTA b/l, no WRR  CARDIO: RRR without murmur  VS: no carotid artery or abdominal aorta bruit, PT and DP 2+ boston.   EXTREMITIES: no cyanosis, clubbing or edema    ASSESSMENT AND PLAN:      Diagnoses and all orders for this visit:    Hypotension, unspecified hypotension type  -     Discontinue: losartan 25 MG Oral Tab; Take 1 tablet (25 mg total) by mouth daily.  -     losartan 25 MG Oral Tab; Take 1 tablet (25 mg total) by mouth daily.  -     EKG with interpretation and Report -IN OFFICE [13053]  -     CBC With Differential With Platelet; Future  -     Comp Metabolic Panel (14) [E]; Future  Will sent Losartan 25 mg , monitor blood pressure   Screening for iron deficiency anemia  -     Ferritin [E]; Future  -     Iron And Tibc; Future  Stable   Low sodium diet   F/u fo worsening symptoms              [1]   Current Outpatient Medications   Medication Sig Dispense Refill    gabapentin 300 MG Oral Cap Take 1 capsule (300 mg total) by mouth in the morning, at noon, and at bedtime. 270 capsule 1    sertraline 50 MG Oral Tab  Take 1 tablet (50 mg total) by mouth daily. 90 tablet 0    losartan 50 MG Oral Tab Take 1 tablet (50 mg total) by mouth daily. 90 tablet 1    clonazePAM 0.25 MG Oral Tablet Dispersible Take 1-2 tablets (0.25-0.5 mg total) by mouth 2 (two) times daily as needed. 30 tablet 0    benzonatate 200 MG Oral Cap Take 1 capsule (200 mg total) by mouth 3 (three) times daily as needed for cough. (Patient not taking: Reported on 5/20/2025) 60 capsule 0    carbidopa-levodopa  MG Oral Tab Take 1 tablet by mouth 3 (three) times daily. 270 tablet 1    melatonin 5 MG Oral Cap Take 1 capsule (5 mg total) by mouth nightly as needed.      magnesium 250 MG Oral Tab Take 1 tablet (250 mg total) by mouth daily.      Multiple Vitamins-Minerals (HEALTHY EYES OR) Take 1 tablet by mouth daily.      Calcium Polycarbophil (FIBER) 625 MG Oral Tab Take 1 tablet (625 mg total) by mouth daily.      acidophilus-pectin Oral Cap Take 2 capsules by mouth in the morning.      fluticasone propionate 50 MCG/ACT Nasal Suspension 2 sprays by Each Nare route daily as needed for Rhinitis.      clobetasol 0.05 % External Ointment Apply sparingly at HS twice a week for 6 weeks then once a week at HS 30 g 0   [2]   Past Medical History:   ALLERGIC RHINITIS    Allergic rhinitis    Anesthesia complication    very slow to wake     Anxiety    Arthritis    Atherosclerosis of coronary artery    Back problem    lower back    Cough    Diverticulitis    Esophageal reflux    Essential hypertension    GERD    H/O mammogram    benign    History of endometrial biopsy    History of general anesthesia complication    slow to wake up    Hyperlipidemia    Osteoarthrosis, unspecified whether generalized or localized, unspecified site    Pap smear for cervical cancer screening    negative    Parkinson's disease (HCC)    Tremor    Unspecified disorder of thyroid    thyroid nodules   [3]   Past Surgical History:  Procedure Laterality Date    Anesth,surgery of shoulder Left      SLAP tear, rotator cuff repair, moved a tendon    Cholecystectomy      Colon surgery  01/15/2018    Low anterior colon resection proctoscopy lysis of adhesions    Colonoscopy  6/3/11= Normal    Colonoscopy  18= Diverticulosis    Repeat     Colonoscopy N/A 2018    Procedure: COLONOSCOPY, POSSIBLE BIOPSY, POSSIBLE POLYPECTOMY 14461;  Surgeon: Maxime Garcia MD;  Location: [facility]    Cyst removal Left     forearm    D & c      Dilation/curettage,diagnostic      Hysterectomy      partial hystero    Laparoscopic cholecystectomy N/A 2016    Procedure: LAPAROSCOPIC CHOLECYSTECTOMY;  Surgeon: Martinez Trejo MD;  Location: [facility] MAIN OR          Other      cystocele repair    Other surgical history      cystocele repair 10 yrs ago    Vaginal hysterectomy N/A 2017    Procedure: VAGINAL HYSTERECTOMY; ovaries intact   [4]   Social History  Socioeconomic History    Marital status:     Number of children: 4   Tobacco Use    Smoking status: Former     Current packs/day: 0.00     Average packs/day: 1 pack/day for 3.0 years (3.0 ttl pk-yrs)     Types: Cigarettes     Start date: 1975     Quit date: 1978     Years since quittin.5     Passive exposure: Never    Smokeless tobacco: Never   Vaping Use    Vaping status: Never Used   Substance and Sexual Activity    Alcohol use: Yes     Alcohol/week: 1.0 standard drink of alcohol     Types: 1 Glasses of wine per week     Comment: occ    Drug use: No    Sexual activity: Yes     Partners: Male   Other Topics Concern     Service No    Blood Transfusions No    Caffeine Concern No    Stress Concern No    Weight Concern No    Special Diet No    Exercise No    Seat Belt No   Social History Narrative    Social History:      Marital Status and Family/Children: , 4 children, 2 siblings    Occupation/Financial Situation: housewife    Pets: 1 dog    Diet: no food allergies, concerned about blue cheese possible  allergy    Exercise: 3x per week big moves for parkinson's, walk everyday    Caffeine: 1-2 cups per day    Water intake: 5-6 cups per day    Sleep: 8 or more mostly    SmokingHx: +former    Alcohol Use: rare    Drug Use: none     Social Drivers of Health     Food Insecurity: No Food Insecurity (4/3/2025)    NCSS - Food Insecurity     Worried About Running Out of Food in the Last Year: No     Ran Out of Food in the Last Year: No   Transportation Needs: No Transportation Needs (4/3/2025)    NCSS - Transportation     Lack of Transportation: No   Housing Stability: Not At Risk (4/3/2025)    NCSS - Housing/Utilities     Has Housing: Yes     Worried About Losing Housing: No     Unable to Get Utilities: No

## 2025-07-11 DIAGNOSIS — F33.1 MODERATE EPISODE OF RECURRENT MAJOR DEPRESSIVE DISORDER (HCC): ICD-10-CM

## 2025-07-11 NOTE — TELEPHONE ENCOUNTER
Kathleen Easton requesting Medication Refill for:    sertraline  MG Oral Tab   Quantity 45.0  1/2 tablet by mouth daily    Preferred Pharmacy:     [facility]ACY #[id number] - [place] [address] [facility] AT [facility], [phone], [phone]       90 day prescription request

## 2025-07-16 NOTE — TELEPHONE ENCOUNTER
LAST OFFICE VISIT Assessment & Plan  Depression  Depressive symptoms post-accident, exacerbated by stress. History of depression. Duloxetine not tolerated. Sertraline discussed for mood improvement. Clonazepam offered for anxiety.  - Start sertraline.    Sertraline started, is refill appropriate, Medication pended for your review/approval

## 2025-07-21 NOTE — TELEPHONE ENCOUNTER
I called pt to schedule an appointment for her; patient said she doesn't want any depression medication, she never wanted it, and she is not going to schedule an appointment.

## (undated) DIAGNOSIS — M25.552 HIP PAIN, ACUTE, LEFT: Primary | ICD-10-CM

## (undated) DIAGNOSIS — M25.552 LEFT HIP PAIN: Primary | ICD-10-CM

## (undated) DIAGNOSIS — S73.192A TEAR OF LEFT ACETABULAR LABRUM, INITIAL ENCOUNTER: ICD-10-CM

## (undated) DEVICE — POOLE SUCTION HANDLE: Brand: CARDINAL HEALTH

## (undated) DEVICE — SUTURE VICRYL 0 CT-1

## (undated) DEVICE — RETRACTOR LONE STAR STAYS LG

## (undated) DEVICE — SOL  .9 1000ML BTL

## (undated) DEVICE — SOL .9 100ML

## (undated) DEVICE — CHLORAPREP 26ML APPLICATOR

## (undated) DEVICE — SUTURE VICRYL 0 UR-6

## (undated) DEVICE — DISSECTOR SONICISION CORDLESS

## (undated) DEVICE — SUTURE PDS II 1 CT-1

## (undated) DEVICE — ECHELON FLEX POWERED PLUS ARTICULATING ENDOSCOPIC LINEAR CUTTER , 60MM: Brand: ECHELON FLEX

## (undated) DEVICE — MEDI-VAC NON-CONDUCTIVE SUCTION TUBING: Brand: CARDINAL HEALTH

## (undated) DEVICE — STANDARD HYPODERMIC NEEDLE,POLYPROPYLENE HUB: Brand: MONOJECT

## (undated) DEVICE — GOWN,SIRUS,FABRIC-REINFORCED,X-LARGE: Brand: MEDLINE

## (undated) DEVICE — TOWEL: OR BLU 80/CS: Brand: MEDICAL ACTION INDUSTRIES

## (undated) DEVICE — 40580 - THE PINK PAD - ADVANCED TRENDELENBURG POSITIONING KIT: Brand: 40580 - THE PINK PAD - ADVANCED TRENDELENBURG POSITIONING KIT

## (undated) DEVICE — COVER,MAYO STAND,STERILE: Brand: MEDLINE

## (undated) DEVICE — ENDOPATH ECHELON ENDOSCOPIC LINEAR CUTTER RELOADS, BLUE, 60MM: Brand: ECHELON ENDOPATH

## (undated) DEVICE — PAD SACRAL SPAN AID

## (undated) DEVICE — LUBRICANT JLY SURGILUBE 2OZ

## (undated) DEVICE — STERILE POLYISOPRENE POWDER-FREE SURGICAL GLOVES: Brand: PROTEXIS

## (undated) DEVICE — CAUTERY PENCIL

## (undated) DEVICE — TUBING CYSTO

## (undated) DEVICE — BAG DRAIN INFECTION CNTRL 2000

## (undated) DEVICE — TROCAR: Brand: KII SHIELDED BLADED ACCESS SYSTEM

## (undated) DEVICE — SUTURE VICRYL 3-0 SH

## (undated) DEVICE — 3M™ STERI-DRAPE™ INSTRUMENT POUCH 1018: Brand: STERI-DRAPE™

## (undated) DEVICE — UNDYED BRAIDED (POLYGLACTIN 910), SYNTHETIC ABSORBABLE SUTURE: Brand: COATED VICRYL

## (undated) DEVICE — 3M(TM) MICROPORE TAPE DISPENSER 1535-2: Brand: 3M™ MICROPORE™

## (undated) DEVICE — GLOVE SURG SENSICARE SZ 6

## (undated) DEVICE — Device

## (undated) DEVICE — SOL  .9 3000ML

## (undated) DEVICE — #10 STERILE BLADE: Brand: POLYMER COATED BLADES

## (undated) DEVICE — GOWN,SIRUS,FABRIC-REINFORCED,LARGE: Brand: MEDLINE

## (undated) DEVICE — SUTURE VICRYL 2-0 CT-1

## (undated) DEVICE — GLOVE SURG SENSICARE SZ 7-1/2

## (undated) DEVICE — VIOLET BRAIDED (POLYGLACTIN 910), SYNTHETIC ABSORBABLE SUTURE: Brand: COATED VICRYL

## (undated) DEVICE — TROCAR: Brand: KII FIOS FIRST ENTRY

## (undated) DEVICE — MEDI-VAC TUBING CONNECTOR 6-IN-1 "Y" POLYPROPYLENE: Brand: CARDINAL HEALTH

## (undated) DEVICE — SUTURE PROLENE 2-0 SH

## (undated) DEVICE — SUTURE VICRYL 1 CT-1

## (undated) DEVICE — KENDALL SCD EXPRESS SLEEVES, KNEE LENGTH, MEDIUM: Brand: KENDALL SCD

## (undated) DEVICE — NEEDLE SPINAL 20X3-1/2 YELLOW

## (undated) DEVICE — SPONGE STICK WITH PVP-I: Brand: KENDALL

## (undated) DEVICE — DRAIN RELIAVAC 100CC

## (undated) DEVICE — SYRINGE 10ML LL TIP

## (undated) DEVICE — DRESSING OPTIFOAM AG 3.5X6

## (undated) DEVICE — LAP COLON CDS: Brand: MEDLINE INDUSTRIES, INC.

## (undated) DEVICE — PLASTC TOOMEY SYRNG DISP

## (undated) DEVICE — SUTURE VICRYL 2-0

## (undated) DEVICE — SUTURE VICRYL 0

## (undated) DEVICE — DRESSING OPTIFOAM AG 3.5X10

## (undated) DEVICE — GYN CDS: Brand: MEDLINE INDUSTRIES, INC.

## (undated) DEVICE — REM POLYHESIVE ADULT PATIENT RETURN ELECTRODE: Brand: VALLEYLAB

## (undated) DEVICE — SYRINGE 30ML LL TIP

## (undated) DEVICE — DECANTER BAG 9": Brand: MEDLINE INDUSTRIES, INC.

## (undated) DEVICE — 3M™ TEGADERM™ TRANSPARENT FILM DRESSING, 1626W, 4 IN X 4-3/4 IN (10 CM X 12 CM), 50 EACH/CARTON, 4 CARTON/CASE: Brand: 3M™ TEGADERM™

## (undated) DEVICE — TISSUE FUSION LAPAROSCOPIC INSTRUMENT: Brand: LIGASURE ATLAS

## (undated) NOTE — ED AVS SNAPSHOT
Martin Abby   MRN: [id number]    Department:  [facility] Emergency Department   Date of Visit:  12/3/2017           Disclosure     Insurance plans vary and the physician(s) referred by the ER may not be covered by your plan.  Please contact your i tell this physician (or your personal doctor if your instructions are to return to your personal doctor) about any new or lasting problems. The primary care or specialist physician will see patients referred from the [facility] Emergency Department.  Arthor Bernheim

## (undated) NOTE — IP AVS SNAPSHOT
[facility] [place] [address] [facility], [address] ~ [phone]                Discharge Summary   5/25/2017    [facility] Harm           Admission Information        Provider Department    5/25/2017 Steve Dash MD  [id number]         Tammy Mora HYDROcodone-acetaminophen 5-325 MG Tabs   Last time this was given:  2 tablets on 5/27/2017  8:59 AM   Commonly known as:  NORCO        Take 1-2 tablets by mouth every 6 (six) hours as needed for Pain. Oscar Holes off on taking.   Chester Morris for cough  3% solution                     Continue your home routine       FISH OIL + D3 OR   Next dose due:  5/28        Take 2 capsules by mouth daily.          Continue your home routine                      Fluticasone Propionate 50 MCG/ACT Susp   Comm Last time this was given:  18 mcg on 5/27/2017  9:00 AM   Next dose due:  5/28        Inhale 1 puff into the lungs daily.  18mcg per capsule         Continue your home routine                   Vitamin B-12 100 MCG Tabs   Commonly known as:  VITAMIN B12   N • Temperature above 100.5 for 4 hours or above 101.0 at any time  • Chest pain or trouble breathing  • Vaginal bleeding heavier than a period  • Redness, tenderness or swelling of your legs  • Pain or burning when you urinate or difficulty passing urine  • HYSTERECTOMY, RECOVERING (ENGLISH)    ACETAMINOPHEN; HYDROCODONE TABLETS OR CAPSULES (ENGLISH)    IBUPROFEN TABLETS AND CAPSULES (ENGLISH)    BOSWELL CATHETER, CARE (ENGLISH)    INDWELLING URINARY CATHETER, DISCHARGE INSTRUCTIONS (ENGLISH)    LEG BAG, DISCH 94.6    (05/02/17)  323       Recent Hematology Lab Results (cont.)  (Last 3 results in the past 90 days)    Neutrophil % Lymphocyte % Monocyte % Eosinophil % Basophil % Prelim Neut Abs Final Neut Abs Lymphocyte Abso Monocyte Absolu Eosinophil Abso Basophi - If you don’t have insurance, Irish [employer] has partnered with [facility] to help you get signed up for insurance coverage.   [facility] is a Federal Navigator program that can help with your Affordable Care Act cover Most common side effects: Dizziness or feeling lightheaded (especially with standing), heart rate changes, headaches, nausea/vomiting   What to report to your healthcare team:  Dizziness, nausea, chest pain, weakness, numbness           Blood Producing Med Ipratropium Bromide 0.03 % Nasal Solution    Albuterol Sulfate HFA (PROAIR HFA) 108 (90 BASE) MCG/ACT Inhalation Aero Soln       Use:  Treatment of asthma, COPD/emphysema, cough, allergies   Most common side effects: Headache, nasal irritation, palpitatio

## (undated) NOTE — MR AVS SNAPSHOT
[address]  [address]  [address] [place] [address]  [phone]               Thank you for choosing us for your health care visit with Dyana Calzada.   We are glad to serve you and happy to provide you with this summar [address] MEDICINE    HAVING A URINARY CATHETER AFTER SURGERY    What is a urinary catheter? A catheter is a soft tube used to drain urine from your bladder. Why do I need a catheter?   A urinary catheter is used to help with heali If you are having trouble emptying your bladder, try the following tips:  · Urinate normally then stand up, walk around for a minute or two, sit back down on the commode and attempt to urinate (double void).   · Sit in a tub of warm water and try to urinate the referring physician within 7-14 days. Patients in our clinic are given an appointment to come back to discuss the results and any appropriate treatment recommendations.     Please do not hesitate to contact our office with any questions or concerns at Inhale 2 puffs into the lungs every 6 (six) hours as needed for Wheezing (no more 8 inhalations in 24 hours). Saline Nasal Spray 0.65 % Soln   2 sprays by Nasal route 2 (two) times daily.    Commonly known as:  OCEAN NASAL SPRAY           Tiotropi

## (undated) NOTE — LETTER
WHERE IS YOUR PAIN NOW?  Triston the areas on your body where you feel the described sensations.  Use the appropriate symbol.  Triston the areas of radiation.  Include all affected areas.  Just to complete the picture, please draw in the face.     ACHE:  ^ ^ ^   NUMBNESS:  0000   PINS & NEEDLES:  = = = =                              ^ ^ ^                       0000              = = = =                                    ^ ^ ^                       0000            = = = =      BURNING:  XXXX   STABBING: ////                  XXXX                ////                         XXXX          ////     Please triston the line below indicating your degree of pain right now  with 0 being no pain 10 being the worst pain possible.                                         0             1             2              3             4              5              6              7             8             9             10         Patient Signature:

## (undated) NOTE — LETTER
03/10/23      Dear Lalo Balderas,    Just a friendly reminder you are due for your Medicare Advantage Wellness Visit. Unlike regular appointments for medication refills or care of an acute illness, this time is uniquely dedicated to prevention, screening and coordination of care. There is simply not enough time at routine medical follow-up visits to cover all of these important topics. At a well visit, I will discuss your day-to-day functioning, update your health history and medication list, I would screen for vision, hearing, memory problems, depression, cancer, osteoporosis and heart attack or stroke risks. This is also the time to review and update vaccines, if necessary, to prevent pneumonia or flu. I can also offer advice on healthful eating and exercise, advice on weight loss if needed, and steps you can take to prevent falls or other injuries. Lastly, and optionally, wellness visits allow time to discuss advance directives such as living will or health care power of . I want to make sure that your personal wishes for end-of-life care are documented and respected in case you develop future health problems that make you unable to express your desires for life-sustaining care. As you see, this wellness benefit, created only in the last few years by Medicare, offers a unique opportunity for a thorough review of your current health status, and a chance for me to work with you to improve your health and set goals for your health care. I strongly encourage you to schedule a visit at your soonest opportunity. Please call the office at [phone]  to schedule your Medicare Advantage Wellness Visit or for your convenience you can schedule thru mychart.     Sincerely,  Dr. Praful Hyatt

## (undated) NOTE — MR AVS SNAPSHOT
[facility] [address]  [address]  [address] [place], [place] [id number]  [phone]               Thank you for choosing us for your health care visit with Olga Barrientos.   We are glad to serve you and happy to provide you with this summar Tylenol W/Codeine Dizziness    fainting    Other     Blue Cheese - Lip swelling    Pain Relief     Sensitivity to Pain meds    Vicodin [Hydrocodone-Acetaminophen]     'PASSED OUT\"                Today's Vital Signs     BP Pulse Temp Height Weight BMI Commonly known as:  HYZAAR           Mometasone Furoate 220 MCG/INH Aepb   Inhale 2 puffs into the lungs 2 (two) times daily.    Commonly known as:  ASMANEX           Montelukast Sodium 10 MG Tabs   1 tab po qd ; **Hold until pt requests   Commonly known as [email or web address]

## (undated) NOTE — MR AVS SNAPSHOT
[facility]  [address]ifred Chiang, [facility] [address]  [address] [phone]               Thank you for choosing us for your health care visit with Calin Crockett MD.  We are glad to serve you and happy to provide you with this sum Commonly known as:  TESSALON           CAPSAICIN HP EX   Use as directed in the mouth or throat. Spray to Back of throat as needed for cough  3% solution           FISH OIL + D3 OR   Take 2 capsules by mouth daily.            Fluticasone Propionate 50 MCG/A [email or web address]. If you've recently had a stay at the Hospital you can access your discharge instructions in [email or web address] by going to Visits < Admission Summaries.  If you've been to the Emergency Department or your doctor's office, you can view yo

## (undated) NOTE — Clinical Note
Dear Dr. Mariee,  I had the opportunity to see your patient Kathleen Easton recently. I appreciate your confidence in me to care for your patients. Please feel free call me with any questions at [phone] or contact me through Epic.  Sincerely, Madi May MD Board Certified, Physical Medicine and Rehabilitation Specializing in Sports Medicine, Spine Medicine and Electrodiagnostic Medicine [facility]

## (undated) NOTE — Clinical Note
Consent to Procedure/Sedation    Date: __3/29/2017_____    Time: ___9:40 AM ___    1. I authorize the performance upon Melody Romero the following:  [employer]rodynamics (UDS)      2.  Flo Zuñiga(and whomever is designated as the doctor’s assistant), ___________________________    ___________________    Witness: ____________________     Date: ______________    Printed: 3/29/2017   9:40 AM    Patient Name: Hasmukh Campbell        : 1955       Medical Record #: [id number]

## (undated) NOTE — LETTER
[facility]  Phone: [phone]  Right Fax: [phone]  [address] [place] [address] By:  Dulce Guillen RN Date: 1/8/18    Patient Name: Fulton Bottom  Surgery Date: 1/15/2018    CSN: [id number]  Medical Record: